# Patient Record
Sex: FEMALE | Race: WHITE | NOT HISPANIC OR LATINO | URBAN - METROPOLITAN AREA
[De-identification: names, ages, dates, MRNs, and addresses within clinical notes are randomized per-mention and may not be internally consistent; named-entity substitution may affect disease eponyms.]

---

## 2018-09-11 ENCOUNTER — ON CAMPUS - OUTPATIENT (OUTPATIENT)
Dept: URBAN - METROPOLITAN AREA HOSPITAL 85 | Facility: HOSPITAL | Age: 68
End: 2018-09-11

## 2018-09-11 ENCOUNTER — HOSPITAL ENCOUNTER (OUTPATIENT)
Dept: PREOP | Facility: HOSPITAL | Age: 68
Setting detail: HOSPITAL OUTPATIENT SURGERY
Discharge: HOME | End: 2018-09-11
Attending: INTERNAL MEDICINE | Admitting: INTERNAL MEDICINE

## 2018-09-11 DIAGNOSIS — K64.8 OTHER HEMORRHOIDS: ICD-10-CM

## 2018-09-11 DIAGNOSIS — D12.2 BENIGN NEOPLASM OF ASCENDING COLON: ICD-10-CM

## 2018-09-11 DIAGNOSIS — K57.90 DIVERTICULOSIS OF INTESTINE, PART UNSPECIFIED, WITHOUT PERFO: ICD-10-CM

## 2018-09-11 DIAGNOSIS — D12.0 BENIGN NEOPLASM OF CECUM: ICD-10-CM

## 2018-09-11 DIAGNOSIS — Z12.11 ENCOUNTER FOR SCREENING FOR MALIGNANT NEOPLASM OF COLON: ICD-10-CM

## 2018-09-11 PROCEDURE — 45385 COLONOSCOPY W/LESION REMOVAL: CPT | Performed by: INTERNAL MEDICINE

## 2019-07-08 ENCOUNTER — HOSPITAL ENCOUNTER (INPATIENT)
Facility: HOSPITAL | Age: 69
LOS: 1 days | Discharge: HOME | End: 2019-07-10
Attending: EMERGENCY MEDICINE | Admitting: INTERNAL MEDICINE

## 2019-07-08 ENCOUNTER — APPOINTMENT (OUTPATIENT)
Dept: CT IMAGING | Facility: HOSPITAL | Age: 69
End: 2019-07-08

## 2019-07-08 DIAGNOSIS — R10.12 LEFT UPPER QUADRANT PAIN: ICD-10-CM

## 2019-07-08 DIAGNOSIS — R79.89 ABNORMAL LFTS: ICD-10-CM

## 2019-07-08 DIAGNOSIS — K80.20 GALLSTONES: ICD-10-CM

## 2019-07-08 DIAGNOSIS — R10.13 EPIGASTRIC PAIN: ICD-10-CM

## 2019-07-08 DIAGNOSIS — K80.50 CHOLEDOCHOLITHIASIS: Primary | ICD-10-CM

## 2019-07-08 LAB
ALBUMIN SERPL-MCNC: 4.3 G/DL (ref 3.5–4.8)
ALBUMIN/GLOB SERPL: 1.3 G/DL (ref 1–1.7)
ALP SERPL-CCNC: 416 U/L (ref 32–91)
ALT SERPL W P-5'-P-CCNC: 87 U/L (ref 14–54)
ANION GAP SERPL CALCULATED.3IONS-SCNC: 16.5 MMOL/L (ref 5–15)
AST SERPL-CCNC: 168 U/L (ref 15–41)
BILIRUB SERPL-MCNC: 1.3 MG/DL (ref 0.3–1.2)
BILIRUB UR QL STRIP: NEGATIVE
BUN BLD-MCNC: 15 MG/DL (ref 8–20)
BUN/CREAT SERPL: 21.4 (ref 5.4–26.2)
CALCIUM SPEC-SCNC: 9.5 MG/DL (ref 8.9–10.3)
CHLORIDE SERPL-SCNC: 104 MMOL/L (ref 101–111)
CLARITY UR: ABNORMAL
CO2 SERPL-SCNC: 23 MMOL/L (ref 22–32)
COLOR UR: YELLOW
CREAT BLD-MCNC: 0.7 MG/DL (ref 0.4–1)
DEPRECATED RDW RBC AUTO: 41.6 FL (ref 37–54)
EOSINOPHIL # BLD MANUAL: 0.12 10*3/MM3 (ref 0–0.4)
EOSINOPHIL NFR BLD MANUAL: 1 % (ref 0.3–6.2)
ERYTHROCYTE [DISTWIDTH] IN BLOOD BY AUTOMATED COUNT: 13.4 % (ref 12.3–15.4)
GFR SERPL CREATININE-BSD FRML MDRD: 83 ML/MIN/1.73
GLOBULIN UR ELPH-MCNC: 3.2 GM/DL (ref 2.5–3.8)
GLUCOSE BLD-MCNC: 127 MG/DL (ref 65–99)
GLUCOSE UR STRIP-MCNC: NEGATIVE MG/DL
HCT VFR BLD AUTO: 42.6 % (ref 34–46.6)
HGB BLD-MCNC: 14.5 G/DL (ref 12–15.9)
HGB UR QL STRIP.AUTO: NEGATIVE
HOLD SPECIMEN: NORMAL
HOLD SPECIMEN: NORMAL
KETONES UR QL STRIP: NEGATIVE
LEUKOCYTE ESTERASE UR QL STRIP.AUTO: NEGATIVE
LIPASE SERPL-CCNC: 56 U/L (ref 22–51)
LYMPHOCYTES # BLD MANUAL: 1.15 10*3/MM3 (ref 0.7–3.1)
LYMPHOCYTES NFR BLD MANUAL: 10 % (ref 19.6–45.3)
MCH RBC QN AUTO: 29.9 PG (ref 26.6–33)
MCHC RBC AUTO-ENTMCNC: 33.9 G/DL (ref 31.5–35.7)
MCV RBC AUTO: 88.1 FL (ref 79–97)
NEUTROPHILS # BLD AUTO: 10.24 10*3/MM3 (ref 1.7–7)
NEUTROPHILS NFR BLD MANUAL: 69 % (ref 42.7–76)
NEUTS BAND NFR BLD MANUAL: 20 % (ref 0–5)
NITRITE UR QL STRIP: NEGATIVE
PH UR STRIP.AUTO: 8.5 [PH] (ref 5–8)
PLAT MORPH BLD: NORMAL
PLATELET # BLD AUTO: 299 10*3/MM3 (ref 140–450)
PMV BLD AUTO: 7 FL (ref 6–12)
POTASSIUM BLD-SCNC: 3.5 MMOL/L (ref 3.6–5.1)
PROT SERPL-MCNC: 7.5 G/DL (ref 6.1–7.9)
PROT UR QL STRIP: NEGATIVE
RBC # BLD AUTO: 4.84 10*6/MM3 (ref 3.77–5.28)
RBC MORPH BLD: NORMAL
SCAN SLIDE: NORMAL
SODIUM BLD-SCNC: 140 MMOL/L (ref 136–144)
SP GR UR STRIP: 1.02 (ref 1–1.03)
UROBILINOGEN UR QL STRIP: ABNORMAL
WBC MORPH BLD: NORMAL
WBC NRBC COR # BLD: 11.5 10*3/MM3 (ref 3.4–10.8)
WHOLE BLOOD HOLD SPECIMEN: NORMAL

## 2019-07-08 PROCEDURE — 25010000002 HYDROMORPHONE PER 4 MG: Performed by: EMERGENCY MEDICINE

## 2019-07-08 PROCEDURE — 83690 ASSAY OF LIPASE: CPT | Performed by: EMERGENCY MEDICINE

## 2019-07-08 PROCEDURE — 85007 BL SMEAR W/DIFF WBC COUNT: CPT | Performed by: EMERGENCY MEDICINE

## 2019-07-08 PROCEDURE — 99285 EMERGENCY DEPT VISIT HI MDM: CPT

## 2019-07-08 PROCEDURE — 85025 COMPLETE CBC W/AUTO DIFF WBC: CPT | Performed by: EMERGENCY MEDICINE

## 2019-07-08 PROCEDURE — 81003 URINALYSIS AUTO W/O SCOPE: CPT | Performed by: EMERGENCY MEDICINE

## 2019-07-08 PROCEDURE — 74177 CT ABD & PELVIS W/CONTRAST: CPT

## 2019-07-08 PROCEDURE — 0 IOPAMIDOL PER 1 ML: Performed by: EMERGENCY MEDICINE

## 2019-07-08 PROCEDURE — 80053 COMPREHEN METABOLIC PANEL: CPT | Performed by: EMERGENCY MEDICINE

## 2019-07-08 PROCEDURE — 25010000002 PROMETHAZINE PER 50 MG: Performed by: EMERGENCY MEDICINE

## 2019-07-08 RX ORDER — PANTOPRAZOLE SODIUM 40 MG/10ML
40 INJECTION, POWDER, LYOPHILIZED, FOR SOLUTION INTRAVENOUS ONCE
Status: COMPLETED | OUTPATIENT
Start: 2019-07-08 | End: 2019-07-08

## 2019-07-08 RX ORDER — HYDROMORPHONE HCL 110MG/55ML
1 PATIENT CONTROLLED ANALGESIA SYRINGE INTRAVENOUS ONCE
Status: COMPLETED | OUTPATIENT
Start: 2019-07-08 | End: 2019-07-08

## 2019-07-08 RX ORDER — SODIUM CHLORIDE, SODIUM LACTATE, POTASSIUM CHLORIDE, CALCIUM CHLORIDE 600; 310; 30; 20 MG/100ML; MG/100ML; MG/100ML; MG/100ML
100 INJECTION, SOLUTION INTRAVENOUS CONTINUOUS
Status: DISCONTINUED | OUTPATIENT
Start: 2019-07-08 | End: 2019-07-10 | Stop reason: HOSPADM

## 2019-07-08 RX ADMIN — PROMETHAZINE HYDROCHLORIDE 25 MG: 25 INJECTION INTRAMUSCULAR; INTRAVENOUS at 20:49

## 2019-07-08 RX ADMIN — PANTOPRAZOLE SODIUM 40 MG: 40 INJECTION, POWDER, FOR SOLUTION INTRAVENOUS at 20:49

## 2019-07-08 RX ADMIN — HYDROMORPHONE HYDROCHLORIDE 1 MG: 2 INJECTION, SOLUTION INTRAMUSCULAR; INTRAVENOUS; SUBCUTANEOUS at 20:51

## 2019-07-08 RX ADMIN — IOPAMIDOL 100 ML: 755 INJECTION, SOLUTION INTRAVENOUS at 22:30

## 2019-07-08 RX ADMIN — SODIUM CHLORIDE, SODIUM LACTATE, POTASSIUM CHLORIDE, AND CALCIUM CHLORIDE 1000 ML: .6; .31; .03; .02 INJECTION, SOLUTION INTRAVENOUS at 20:51

## 2019-07-09 ENCOUNTER — INPATIENT HOSPITAL (OUTPATIENT)
Dept: URBAN - METROPOLITAN AREA HOSPITAL 84 | Facility: HOSPITAL | Age: 69
End: 2019-07-09

## 2019-07-09 ENCOUNTER — APPOINTMENT (OUTPATIENT)
Dept: GENERAL RADIOLOGY | Facility: HOSPITAL | Age: 69
End: 2019-07-09

## 2019-07-09 ENCOUNTER — ANESTHESIA (OUTPATIENT)
Dept: GASTROENTEROLOGY | Facility: HOSPITAL | Age: 69
End: 2019-07-09

## 2019-07-09 ENCOUNTER — ANESTHESIA EVENT (OUTPATIENT)
Dept: GASTROENTEROLOGY | Facility: HOSPITAL | Age: 69
End: 2019-07-09

## 2019-07-09 DIAGNOSIS — K80.50 CALCULUS OF BILE DUCT WITHOUT CHOLANGITIS OR CHOLECYSTITIS W: ICD-10-CM

## 2019-07-09 DIAGNOSIS — R10.13 EPIGASTRIC PAIN: ICD-10-CM

## 2019-07-09 DIAGNOSIS — R79.89 OTHER SPECIFIED ABNORMAL FINDINGS OF BLOOD CHEMISTRY: ICD-10-CM

## 2019-07-09 DIAGNOSIS — K83.8 OTHER SPECIFIED DISEASES OF BILIARY TRACT: ICD-10-CM

## 2019-07-09 PROBLEM — G47.00 INSOMNIA: Chronic | Status: ACTIVE | Noted: 2019-07-09

## 2019-07-09 PROBLEM — F32.A DEPRESSION: Chronic | Status: ACTIVE | Noted: 2019-07-09

## 2019-07-09 PROBLEM — R10.12 LEFT UPPER QUADRANT PAIN: Status: ACTIVE | Noted: 2019-07-09

## 2019-07-09 LAB
ALBUMIN SERPL-MCNC: 3.5 G/DL (ref 3.5–4.8)
ALBUMIN/GLOB SERPL: 1.3 G/DL (ref 1–1.7)
ALP SERPL-CCNC: 346 U/L (ref 32–91)
ALT SERPL W P-5'-P-CCNC: 218 U/L (ref 14–54)
ANION GAP SERPL CALCULATED.3IONS-SCNC: 14.1 MMOL/L (ref 5–15)
AST SERPL-CCNC: 361 U/L (ref 15–41)
BASOPHILS # BLD AUTO: 0 10*3/MM3 (ref 0–0.2)
BASOPHILS NFR BLD AUTO: 0.2 % (ref 0–1.5)
BILIRUB SERPL-MCNC: 2.2 MG/DL (ref 0.3–1.2)
BUN BLD-MCNC: 13 MG/DL (ref 8–20)
BUN/CREAT SERPL: 18.6 (ref 5.4–26.2)
CALCIUM SPEC-SCNC: 8.4 MG/DL (ref 8.9–10.3)
CHLORIDE SERPL-SCNC: 105 MMOL/L (ref 101–111)
CO2 SERPL-SCNC: 21 MMOL/L (ref 22–32)
CREAT BLD-MCNC: 0.7 MG/DL (ref 0.4–1)
DEPRECATED RDW RBC AUTO: 42 FL (ref 37–54)
EOSINOPHIL # BLD AUTO: 0 10*3/MM3 (ref 0–0.4)
EOSINOPHIL NFR BLD AUTO: 0 % (ref 0.3–6.2)
ERYTHROCYTE [DISTWIDTH] IN BLOOD BY AUTOMATED COUNT: 13.4 % (ref 12.3–15.4)
GFR SERPL CREATININE-BSD FRML MDRD: 83 ML/MIN/1.73
GLOBULIN UR ELPH-MCNC: 2.7 GM/DL (ref 2.5–3.8)
GLUCOSE BLD-MCNC: 116 MG/DL (ref 65–99)
HCT VFR BLD AUTO: 38.3 % (ref 34–46.6)
HGB BLD-MCNC: 12.5 G/DL (ref 12–15.9)
LYMPHOCYTES # BLD AUTO: 0.7 10*3/MM3 (ref 0.7–3.1)
LYMPHOCYTES NFR BLD AUTO: 5.2 % (ref 19.6–45.3)
MCH RBC QN AUTO: 29.5 PG (ref 26.6–33)
MCHC RBC AUTO-ENTMCNC: 32.7 G/DL (ref 31.5–35.7)
MCV RBC AUTO: 90.2 FL (ref 79–97)
MONOCYTES # BLD AUTO: 0.8 10*3/MM3 (ref 0.1–0.9)
MONOCYTES NFR BLD AUTO: 6.1 % (ref 5–12)
NEUTROPHILS # BLD AUTO: 11.5 10*3/MM3 (ref 1.7–7)
NEUTROPHILS NFR BLD AUTO: 88.5 % (ref 42.7–76)
NRBC BLD AUTO-RTO: 0 /100 WBC (ref 0–0.2)
PLATELET # BLD AUTO: 240 10*3/MM3 (ref 140–450)
PMV BLD AUTO: 7.1 FL (ref 6–12)
POTASSIUM BLD-SCNC: 4.1 MMOL/L (ref 3.6–5.1)
PROT SERPL-MCNC: 6.2 G/DL (ref 6.1–7.9)
RBC # BLD AUTO: 4.24 10*6/MM3 (ref 3.77–5.28)
SODIUM BLD-SCNC: 136 MMOL/L (ref 136–144)
WBC NRBC COR # BLD: 12.9 10*3/MM3 (ref 3.4–10.8)

## 2019-07-09 PROCEDURE — 43262 ENDO CHOLANGIOPANCREATOGRAPH: CPT | Performed by: INTERNAL MEDICINE

## 2019-07-09 PROCEDURE — 43264 ERCP REMOVE DUCT CALCULI: CPT | Performed by: INTERNAL MEDICINE

## 2019-07-09 PROCEDURE — 74328 X-RAY BILE DUCT ENDOSCOPY: CPT

## 2019-07-09 PROCEDURE — 99220 PR INITIAL OBSERVATION CARE/DAY 70 MINUTES: CPT | Performed by: NURSE PRACTITIONER

## 2019-07-09 PROCEDURE — C1769 GUIDE WIRE: HCPCS | Performed by: INTERNAL MEDICINE

## 2019-07-09 PROCEDURE — G0378 HOSPITAL OBSERVATION PER HR: HCPCS

## 2019-07-09 PROCEDURE — 0FC78ZZ EXTIRPATION OF MATTER FROM COMMON HEPATIC DUCT, VIA NATURAL OR ARTIFICIAL OPENING ENDOSCOPIC: ICD-10-PCS | Performed by: INTERNAL MEDICINE

## 2019-07-09 PROCEDURE — 25010000002 PROPOFOL 200 MG/20ML EMULSION: Performed by: ANESTHESIOLOGY

## 2019-07-09 PROCEDURE — 85025 COMPLETE CBC W/AUTO DIFF WBC: CPT | Performed by: NURSE PRACTITIONER

## 2019-07-09 PROCEDURE — 25010000002 FENTANYL CITRATE (PF) 250 MCG/5ML SOLUTION: Performed by: ANESTHESIOLOGY

## 2019-07-09 PROCEDURE — 0 IOPAMIDOL 41 % SOLUTION 20 ML VIAL: Performed by: INTERNAL MEDICINE

## 2019-07-09 PROCEDURE — 25010000002 KETOROLAC TROMETHAMINE PER 15 MG: Performed by: NURSE PRACTITIONER

## 2019-07-09 PROCEDURE — 25010000002 DIPHENHYDRAMINE PER 50 MG: Performed by: NURSE PRACTITIONER

## 2019-07-09 PROCEDURE — 25010000002 LEVOFLOXACIN PER 250 MG: Performed by: NURSE PRACTITIONER

## 2019-07-09 PROCEDURE — 80053 COMPREHEN METABOLIC PANEL: CPT | Performed by: NURSE PRACTITIONER

## 2019-07-09 RX ORDER — PROPOFOL 10 MG/ML
INJECTION, EMULSION INTRAVENOUS AS NEEDED
Status: DISCONTINUED | OUTPATIENT
Start: 2019-07-09 | End: 2019-07-09 | Stop reason: SURG

## 2019-07-09 RX ORDER — FENTANYL CITRATE 50 UG/ML
INJECTION, SOLUTION INTRAMUSCULAR; INTRAVENOUS AS NEEDED
Status: DISCONTINUED | OUTPATIENT
Start: 2019-07-09 | End: 2019-07-09 | Stop reason: SURG

## 2019-07-09 RX ORDER — LEVOFLOXACIN 5 MG/ML
500 INJECTION, SOLUTION INTRAVENOUS ONCE
Status: COMPLETED | OUTPATIENT
Start: 2019-07-09 | End: 2019-07-09

## 2019-07-09 RX ORDER — HYDRALAZINE HYDROCHLORIDE 20 MG/ML
5 INJECTION INTRAMUSCULAR; INTRAVENOUS
Status: DISCONTINUED | OUTPATIENT
Start: 2019-07-09 | End: 2019-07-09 | Stop reason: HOSPADM

## 2019-07-09 RX ORDER — ZOLPIDEM TARTRATE 5 MG/1
5 TABLET ORAL NIGHTLY PRN
Status: DISCONTINUED | OUTPATIENT
Start: 2019-07-09 | End: 2019-07-10 | Stop reason: HOSPADM

## 2019-07-09 RX ORDER — TRAZODONE HYDROCHLORIDE 50 MG/1
50 TABLET ORAL NIGHTLY
Status: DISCONTINUED | OUTPATIENT
Start: 2019-07-09 | End: 2019-07-10 | Stop reason: HOSPADM

## 2019-07-09 RX ORDER — SODIUM CHLORIDE 9 MG/ML
50 INJECTION, SOLUTION INTRAVENOUS CONTINUOUS
Status: DISCONTINUED | OUTPATIENT
Start: 2019-07-09 | End: 2019-07-10 | Stop reason: HOSPADM

## 2019-07-09 RX ORDER — OMEPRAZOLE 20 MG/1
20 CAPSULE, DELAYED RELEASE ORAL 2 TIMES DAILY
COMMUNITY

## 2019-07-09 RX ORDER — SODIUM CHLORIDE 0.9 % (FLUSH) 0.9 %
3 SYRINGE (ML) INJECTION EVERY 12 HOURS SCHEDULED
Status: DISCONTINUED | OUTPATIENT
Start: 2019-07-09 | End: 2019-07-09

## 2019-07-09 RX ORDER — PANTOPRAZOLE SODIUM 40 MG/1
40 TABLET, DELAYED RELEASE ORAL EVERY MORNING
Status: DISCONTINUED | OUTPATIENT
Start: 2019-07-09 | End: 2019-07-10 | Stop reason: HOSPADM

## 2019-07-09 RX ORDER — ONDANSETRON 2 MG/ML
4 INJECTION INTRAMUSCULAR; INTRAVENOUS ONCE AS NEEDED
Status: DISCONTINUED | OUTPATIENT
Start: 2019-07-09 | End: 2019-07-09

## 2019-07-09 RX ORDER — ONDANSETRON 4 MG/1
4 TABLET, FILM COATED ORAL EVERY 8 HOURS PRN
COMMUNITY
End: 2023-03-04

## 2019-07-09 RX ORDER — LIDOCAINE HYDROCHLORIDE 20 MG/ML
INJECTION, SOLUTION EPIDURAL; INFILTRATION; INTRACAUDAL; PERINEURAL AS NEEDED
Status: DISCONTINUED | OUTPATIENT
Start: 2019-07-09 | End: 2019-07-09 | Stop reason: SURG

## 2019-07-09 RX ORDER — PROMETHAZINE HYDROCHLORIDE 25 MG/1
25 TABLET ORAL ONCE AS NEEDED
Status: DISCONTINUED | OUTPATIENT
Start: 2019-07-09 | End: 2019-07-09 | Stop reason: HOSPADM

## 2019-07-09 RX ORDER — NALOXONE HCL 0.4 MG/ML
0.4 VIAL (ML) INJECTION AS NEEDED
Status: DISCONTINUED | OUTPATIENT
Start: 2019-07-09 | End: 2019-07-09 | Stop reason: HOSPADM

## 2019-07-09 RX ORDER — LEVOFLOXACIN 5 MG/ML
500 INJECTION, SOLUTION INTRAVENOUS EVERY 24 HOURS
Status: DISCONTINUED | OUTPATIENT
Start: 2019-07-10 | End: 2019-07-10

## 2019-07-09 RX ORDER — SUCRALFATE 1 G/1
1 TABLET ORAL 4 TIMES DAILY
COMMUNITY

## 2019-07-09 RX ORDER — FLUMAZENIL 0.1 MG/ML
0.2 INJECTION INTRAVENOUS AS NEEDED
Status: DISCONTINUED | OUTPATIENT
Start: 2019-07-09 | End: 2019-07-09 | Stop reason: HOSPADM

## 2019-07-09 RX ORDER — LABETALOL HYDROCHLORIDE 5 MG/ML
5 INJECTION, SOLUTION INTRAVENOUS
Status: DISCONTINUED | OUTPATIENT
Start: 2019-07-09 | End: 2019-07-09 | Stop reason: HOSPADM

## 2019-07-09 RX ORDER — PHENYLEPHRINE HCL IN 0.9% NACL 0.5 MG/5ML
.5-3 SYRINGE (ML) INTRAVENOUS
Status: DISCONTINUED | OUTPATIENT
Start: 2019-07-09 | End: 2019-07-09 | Stop reason: HOSPADM

## 2019-07-09 RX ORDER — SENNA AND DOCUSATE SODIUM 50; 8.6 MG/1; MG/1
1 TABLET, FILM COATED ORAL DAILY PRN
COMMUNITY

## 2019-07-09 RX ORDER — SERTRALINE HYDROCHLORIDE 100 MG/1
100 TABLET, FILM COATED ORAL DAILY
COMMUNITY

## 2019-07-09 RX ORDER — SODIUM CHLORIDE 0.9 % (FLUSH) 0.9 %
3-10 SYRINGE (ML) INJECTION AS NEEDED
Status: DISCONTINUED | OUTPATIENT
Start: 2019-07-09 | End: 2019-07-10 | Stop reason: HOSPADM

## 2019-07-09 RX ORDER — DIPHENHYDRAMINE HYDROCHLORIDE 50 MG/ML
12.5 INJECTION INTRAMUSCULAR; INTRAVENOUS
Status: DISCONTINUED | OUTPATIENT
Start: 2019-07-09 | End: 2019-07-09 | Stop reason: HOSPADM

## 2019-07-09 RX ORDER — PROMETHAZINE HYDROCHLORIDE 25 MG/ML
6.25 INJECTION, SOLUTION INTRAMUSCULAR; INTRAVENOUS ONCE AS NEEDED
Status: DISCONTINUED | OUTPATIENT
Start: 2019-07-09 | End: 2019-07-09 | Stop reason: HOSPADM

## 2019-07-09 RX ORDER — MORPHINE SULFATE 4 MG/ML
1 INJECTION, SOLUTION INTRAMUSCULAR; INTRAVENOUS
Status: DISCONTINUED | OUTPATIENT
Start: 2019-07-09 | End: 2019-07-09 | Stop reason: HOSPADM

## 2019-07-09 RX ORDER — SODIUM CHLORIDE 0.9 % (FLUSH) 0.9 %
3-10 SYRINGE (ML) INJECTION AS NEEDED
Status: DISCONTINUED | OUTPATIENT
Start: 2019-07-09 | End: 2019-07-09

## 2019-07-09 RX ORDER — FENTANYL CITRATE 50 UG/ML
50 INJECTION, SOLUTION INTRAMUSCULAR; INTRAVENOUS
Status: DISCONTINUED | OUTPATIENT
Start: 2019-07-09 | End: 2019-07-09 | Stop reason: HOSPADM

## 2019-07-09 RX ORDER — CYCLOSPORINE 0.5 MG/ML
1 EMULSION OPHTHALMIC 2 TIMES DAILY
Status: DISCONTINUED | OUTPATIENT
Start: 2019-07-09 | End: 2019-07-10 | Stop reason: HOSPADM

## 2019-07-09 RX ORDER — DIPHENHYDRAMINE HYDROCHLORIDE 50 MG/ML
12.5 INJECTION INTRAMUSCULAR; INTRAVENOUS ONCE
Status: COMPLETED | OUTPATIENT
Start: 2019-07-09 | End: 2019-07-09

## 2019-07-09 RX ORDER — ONDANSETRON 2 MG/ML
4 INJECTION INTRAMUSCULAR; INTRAVENOUS ONCE AS NEEDED
Status: DISCONTINUED | OUTPATIENT
Start: 2019-07-09 | End: 2019-07-09 | Stop reason: HOSPADM

## 2019-07-09 RX ORDER — MEPERIDINE HYDROCHLORIDE 25 MG/ML
12.5 INJECTION INTRAMUSCULAR; INTRAVENOUS; SUBCUTANEOUS
Status: DISCONTINUED | OUTPATIENT
Start: 2019-07-09 | End: 2019-07-09 | Stop reason: HOSPADM

## 2019-07-09 RX ORDER — KETOROLAC TROMETHAMINE 15 MG/ML
15 INJECTION, SOLUTION INTRAMUSCULAR; INTRAVENOUS EVERY 6 HOURS PRN
Status: DISCONTINUED | OUTPATIENT
Start: 2019-07-09 | End: 2019-07-10 | Stop reason: HOSPADM

## 2019-07-09 RX ORDER — SENNA PLUS 8.6 MG/1
1 TABLET ORAL NIGHTLY PRN
Status: DISCONTINUED | OUTPATIENT
Start: 2019-07-09 | End: 2019-07-10 | Stop reason: HOSPADM

## 2019-07-09 RX ORDER — ONDANSETRON 4 MG/1
4 TABLET, FILM COATED ORAL EVERY 8 HOURS PRN
Status: DISCONTINUED | OUTPATIENT
Start: 2019-07-09 | End: 2019-07-10 | Stop reason: HOSPADM

## 2019-07-09 RX ORDER — ACETAMINOPHEN 325 MG/1
650 TABLET ORAL ONCE AS NEEDED
Status: DISCONTINUED | OUTPATIENT
Start: 2019-07-09 | End: 2019-07-09 | Stop reason: HOSPADM

## 2019-07-09 RX ORDER — PROMETHAZINE HYDROCHLORIDE 25 MG/1
25 SUPPOSITORY RECTAL ONCE AS NEEDED
Status: DISCONTINUED | OUTPATIENT
Start: 2019-07-09 | End: 2019-07-09 | Stop reason: HOSPADM

## 2019-07-09 RX ORDER — TRAZODONE HYDROCHLORIDE 50 MG/1
50 TABLET ORAL NIGHTLY
COMMUNITY

## 2019-07-09 RX ORDER — ROCURONIUM BROMIDE 10 MG/ML
INJECTION, SOLUTION INTRAVENOUS AS NEEDED
Status: DISCONTINUED | OUTPATIENT
Start: 2019-07-09 | End: 2019-07-09 | Stop reason: SURG

## 2019-07-09 RX ORDER — CYCLOSPORINE 0.5 MG/ML
1 EMULSION OPHTHALMIC 2 TIMES DAILY
COMMUNITY

## 2019-07-09 RX ORDER — SERTRALINE HYDROCHLORIDE 100 MG/1
100 TABLET, FILM COATED ORAL DAILY
Status: DISCONTINUED | OUTPATIENT
Start: 2019-07-09 | End: 2019-07-10 | Stop reason: HOSPADM

## 2019-07-09 RX ORDER — ACETAMINOPHEN 650 MG/1
650 SUPPOSITORY RECTAL ONCE AS NEEDED
Status: DISCONTINUED | OUTPATIENT
Start: 2019-07-09 | End: 2019-07-09 | Stop reason: HOSPADM

## 2019-07-09 RX ORDER — SODIUM CHLORIDE 0.9 % (FLUSH) 0.9 %
3 SYRINGE (ML) INJECTION EVERY 12 HOURS SCHEDULED
Status: DISCONTINUED | OUTPATIENT
Start: 2019-07-09 | End: 2019-07-10 | Stop reason: HOSPADM

## 2019-07-09 RX ADMIN — ROCURONIUM BROMIDE 35 MG: 10 INJECTION, SOLUTION INTRAVENOUS at 11:07

## 2019-07-09 RX ADMIN — SODIUM CHLORIDE, SODIUM LACTATE, POTASSIUM CHLORIDE, AND CALCIUM CHLORIDE 100 ML/HR: 600; 310; 30; 20 INJECTION, SOLUTION INTRAVENOUS at 02:56

## 2019-07-09 RX ADMIN — ZOLPIDEM TARTRATE 5 MG: 5 TABLET, FILM COATED ORAL at 22:54

## 2019-07-09 RX ADMIN — KETOROLAC TROMETHAMINE 15 MG: 15 INJECTION, SOLUTION INTRAMUSCULAR; INTRAVENOUS at 20:09

## 2019-07-09 RX ADMIN — LEVOFLOXACIN 500 MG: 5 INJECTION, SOLUTION INTRAVENOUS at 12:47

## 2019-07-09 RX ADMIN — SODIUM CHLORIDE, PRESERVATIVE FREE 3 ML: 5 INJECTION INTRAVENOUS at 20:09

## 2019-07-09 RX ADMIN — TRAZODONE HYDROCHLORIDE 50 MG: 50 TABLET ORAL at 20:09

## 2019-07-09 RX ADMIN — SODIUM CHLORIDE, SODIUM LACTATE, POTASSIUM CHLORIDE, AND CALCIUM CHLORIDE 200 ML/HR: 600; 310; 30; 20 INJECTION, SOLUTION INTRAVENOUS at 00:45

## 2019-07-09 RX ADMIN — SODIUM CHLORIDE, SODIUM LACTATE, POTASSIUM CHLORIDE, AND CALCIUM CHLORIDE 100 ML/HR: 600; 310; 30; 20 INJECTION, SOLUTION INTRAVENOUS at 14:37

## 2019-07-09 RX ADMIN — KETOROLAC TROMETHAMINE 15 MG: 15 INJECTION, SOLUTION INTRAMUSCULAR; INTRAVENOUS at 01:43

## 2019-07-09 RX ADMIN — LIDOCAINE HYDROCHLORIDE 30 MG: 20 INJECTION, SOLUTION EPIDURAL; INFILTRATION; INTRACAUDAL; PERINEURAL at 11:07

## 2019-07-09 RX ADMIN — DIPHENHYDRAMINE HYDROCHLORIDE 12.5 MG: 50 INJECTION, SOLUTION INTRAMUSCULAR; INTRAVENOUS at 03:42

## 2019-07-09 RX ADMIN — FENTANYL CITRATE 100 MCG: 50 INJECTION INTRAMUSCULAR; INTRAVENOUS at 11:07

## 2019-07-09 RX ADMIN — CYCLOSPORINE 1 DROP: 0.5 EMULSION OPHTHALMIC at 20:09

## 2019-07-09 RX ADMIN — PROPOFOL 120 MG: 10 INJECTION, EMULSION INTRAVENOUS at 11:05

## 2019-07-09 RX ADMIN — SODIUM CHLORIDE, PRESERVATIVE FREE 3 ML: 5 INJECTION INTRAVENOUS at 03:44

## 2019-07-09 RX ADMIN — SODIUM CHLORIDE 50 ML/HR: 0.9 INJECTION, SOLUTION INTRAVENOUS at 11:02

## 2019-07-09 RX ADMIN — SODIUM CHLORIDE, PRESERVATIVE FREE 3 ML: 5 INJECTION INTRAVENOUS at 08:27

## 2019-07-09 NOTE — ANESTHESIA PREPROCEDURE EVALUATION
Anesthesia Evaluation     Patient summary reviewed and Nursing notes reviewed   no history of anesthetic complications:  NPO Solid Status: > 8 hours  NPO Liquid Status: > 8 hours           Airway   Mallampati: II  TM distance: >3 FB  Neck ROM: full  No difficulty expected  Dental      Pulmonary - negative pulmonary ROS    breath sounds clear to auscultation  Cardiovascular     ECG reviewed  Rhythm: regular  Rate: normal    (+) hypertension well controlled less than 2 medications, hyperlipidemia,       Neuro/Psych  (+) psychiatric history Depression,     GI/Hepatic/Renal/Endo    (+)  GERD,      Musculoskeletal     Abdominal     Abdomen: soft.   Substance History - negative use     OB/GYN negative ob/gyn ROS         Other   (+) arthritis                     Anesthesia Plan    ASA 2     general     intravenous induction   Anesthetic plan, all risks, benefits, and alternatives have been provided, discussed and informed consent has been obtained with: patient.    Plan discussed with CAA.

## 2019-07-09 NOTE — ED NOTES
Returns from CT. Denies pain, nausea at this time. IVF continue to infuse. No needs at this time.     Nora Manuel RN  07/08/19 3710

## 2019-07-09 NOTE — ANESTHESIA POSTPROCEDURE EVALUATION
Patient: Wandy Roberts    Procedure Summary     Date:  07/09/19 Room / Location:  [facility] ENDOSCOPY 2 / [facility] ENDOSCOPY    Anesthesia Start:  1100 Anesthesia Stop:  1204    Procedure:  ENDOSCOPIC RETROGRADE CHOLANGIOPANCREATOGRAPHY, SPHINCTEROTOMY, CLEARANCE OF BILE DUCT (9MM-12MM), UP TO 12MM, EXTRACTION OF BILIARY STONES WITH BALLOON (N/A ) Diagnosis:       Abnormal LFTs      Epigastric pain      (Abnormal LFTs [R94.5])      (Epigastric pain [R10.13])    Surgeon:  Kamron Segura MD Provider:  Sarahy Child MD    Anesthesia Type:  general ASA Status:  2          Anesthesia Type: general  Last vitals  BP   146/56 (07/09/19 1239)   Temp   97.3 °F (36.3 °C) (07/09/19 1204)   Pulse   70 (07/09/19 1239)   Resp   17 (07/09/19 1239)     SpO2   100 % (07/09/19 1239)     Post Anesthesia Care and Evaluation    Patient location during evaluation: PACU  Patient participation: complete - patient participated  Level of consciousness: awake  Pain management: adequate  Airway patency: patent  Anesthetic complications: No anesthetic complications    Cardiovascular status: acceptable  Respiratory status: acceptable  Hydration status: acceptable

## 2019-07-09 NOTE — ED PROVIDER NOTES
Subjective   69-year-old complaining of severe epigastric and left upper quadrant pain starting today.  She states is it was associated with nausea and she is vomited 2-3 times.  She states she had similar pain in the past with pancreatitis.  She is distantly status post cholecystectomy.  The patient reports she had no definite fever or chills today.  She reports that she is still nauseated.  She denies nausea.  She reports no melena hematemesis or hematochezia.  She denies dysuria or hematuria.she reports no recent alcohol intake and states that she was told in the past her pancreatitis was from gallstone            Review of Systems   Constitutional: Positive for appetite change and diaphoresis. Negative for chills, fever and unexpected weight change.   HENT: Negative for sore throat.    Eyes: Negative for photophobia and visual disturbance.   Respiratory: Negative for apnea, chest tightness and wheezing.    Cardiovascular: Negative for palpitations and leg swelling.   Gastrointestinal: Positive for abdominal distention, abdominal pain, nausea and vomiting. Negative for anal bleeding, blood in stool, constipation and diarrhea.   Genitourinary: Negative for dysuria and pelvic pain.   Musculoskeletal: Negative for myalgias and neck stiffness.   Skin: Positive for pallor.   Allergic/Immunologic: Negative for food allergies.   Neurological: Negative for tremors and numbness.   All other systems reviewed and are negative.      History reviewed. No pertinent past medical history.    Allergies   Allergen Reactions   • Codeine Itching   • Penicillins Unknown (See Comments)     Childhood        History reviewed. No pertinent surgical history.    No family history on file.    Social History     Socioeconomic History   • Marital status:      Spouse name: Not on file   • Number of children: Not on file   • Years of education: Not on file   • Highest education level: Not on file           Objective   Physical  Exam  Alert Elian Coma Scale 15   HEENT: Pupils equal and reactive to light. Conjunctivae are not injected. normal tympanic membranes. Oropharynx and nares are normal.   Neck: Supple. Midline trachea. No JVD. No goiter.   Chest: Clear and equal breath sounds bilaterally regular rate and rhythm without murmur or rub.   Abdomen: Positive bowel sounds the gastric and left upper quadrant tenderness is noted.  Negative Simmons sign.  The abdomen is nondistended. No rebound or peritoneal signs. No CVA tenderness.  No other referred pain  Extremities no clubbing cyanosis or edema motor sensory exam is normal the full range of motion is intact   skin: Warm and dry, no rashes or petechia.   Lymphatic: No regional lymphadenopathy. No calf pain, swelling or Sonia's sign    Procedures           ED Course  ED Course as of Jul 09 0017   Tue Jul 09, 2019   0009 And nausea relieved in the emergency department patient was advised of the CT results.  The patient will need admission  [TH]      ED Course User Index  [TH] Matt Mckeon MD                  MDM  Number of Diagnoses or Management Options     Amount and/or Complexity of Data Reviewed  Clinical lab tests: reviewed  Tests in the radiology section of CPT®: reviewed  Decide to obtain previous medical records or to obtain history from someone other than the patient: yes  Obtain history from someone other than the patient: yes  Review and summarize past medical records: yes  Discuss the patient with other providers: yes  Independent visualization of images, tracings, or specimens: yes    Risk of Complications, Morbidity, and/or Mortality  Presenting problems: high  Diagnostic procedures: high  Management options: high  General comments: The patient will likely need MRCP.  The patient will be admitted to the hospital.  She will need hydration.  Pain and nausea were controlled in the ER.  The case was discussed the hospitalist practitioner the patient was agreeable with this  plan of treatment    Patient Progress  Patient progress: improved        Final diagnoses:   Choledocholithiasis   Left upper quadrant pain            Matt Mckeon MD  07/09/19 0017

## 2019-07-09 NOTE — ADDENDUM NOTE
Addendum  created 07/09/19 1251 by Sarahy Child MD    Review and Sign - Ready for Procedure, Review and Sign - Signed, Sign clinical note

## 2019-07-09 NOTE — ED NOTES
Report given to Sindhu CHAMBERS RN- care transferred. Pt. Denies pain, s/o at bedside. Ambulatory to restroom with no complication. Awaiting CT results.     Nora Manuel RN  07/09/19 0003

## 2019-07-09 NOTE — CONSULTS
"GI CONSULT  NOTE:    Referring Provider:  Dr. Sweet    Chief complaint: Choledocholithiasis    Subjective . \"abdominal pain like my previous pancreatitis\"    History of present illness:  Patient is a 68 y/o female with a history of cholelithiasis with previous cholecystectomy, pancreatitis secondary to choledocholithiasis status post ERCP in 2016 mild gastroparesis.  She presents with intermittent epigastric and left upper quadrant pain for the last few weeks that worsened yesterday with associated nausea and vomiting.  She reports pain is similar to previous pancreatitis episode.  She has had nausea and vomiting without hematemesis as well as a 4 to 6-month history of early satiety and decreased appetite.  She has had an unintentional weight loss of 10 pounds but denies any fevers or chills.  No constipation on MiraLAX and takes Gas-X as needed.  No melena or rectal bleeding.  She was found to have abnormal LFTs on admission with increased biliary dilation on CT. CT does not suggest pancreatitis.  She denies any obvious trigger to pain nor exacerbating or relieving factors.  No increased shortness of breath or chest pain.      Endo History:  9/2018 colonoscopy by Dr. Lechuga -polyps (TA/SSA), diverticulosis, hemorrhoids  10/2016 EGD by Dr. Marmolejo -normal status post removal of CBD stents x2  8/2016 ERCP by Dr. Marmolejo -marked biliary dilation with CBD stone status post extraction and stent placement.    Past Medical History:  Past Medical History:   Diagnosis Date   • Arthritis    • Cancer (CMS/HCC)     skin carnicomas   • Cholelithiasis    • Depression    • Elevated cholesterol    • GERD (gastroesophageal reflux disease)    • Hypertension    • Pancreatitis due to biliary obstruction    • PUD (peptic ulcer disease)        Past Surgical History:  Past Surgical History:   Procedure Laterality Date   • ABDOMINAL SURGERY     • APPENDECTOMY     • BILE DUCT STENT PLACEMENT     • CHOLECYSTECTOMY     • COLONOSCOPY     • " FRACTURE SURGERY     • HYSTERECTOMY     • SKIN BIOPSY     • TONSILLECTOMY     EGD, colonoscopy, ERCP    Social History:  Social History     Tobacco Use   • Smoking status: Never Smoker   • Smokeless tobacco: Never Used   Substance Use Topics   • Alcohol use: Yes     Alcohol/week: 0.6 oz     Types: 1 Cans of beer per week   • Drug use: No   No tobacco or illicit drug use.  Occasional alcohol use    Family History:  Family History   Problem Relation Age of Onset   • Diabetes Mother    • Cancer Father    • Cancer Brother    No family history of pancreatic disease    Medications:  Medications Prior to Admission   Medication Sig Dispense Refill Last Dose   • cycloSPORINE (RESTASIS) 0.05 % ophthalmic emulsion Administer 1 drop to both eyes 2 (Two) Times a Day.      • omeprazole (priLOSEC) 20 MG capsule Take 20 mg by mouth 2 (Two) Times a Day.      • ondansetron (ZOFRAN) 4 MG tablet Take 4 mg by mouth Every 8 (Eight) Hours As Needed for Nausea or Vomiting.      • sennosides-docusate sodium (SENOKOT-S) 8.6-50 MG tablet Take 1 tablet by mouth Daily As Needed for Constipation.      • sertraline (ZOLOFT) 100 MG tablet Take 100 mg by mouth Daily.      • traZODone (DESYREL) 50 MG tablet Take 50 mg by mouth Every Night.          Scheduled Meds:  cycloSPORINE 1 drop Both Eyes BID   pantoprazole 40 mg Oral QAM   sertraline 100 mg Oral Daily   sodium chloride 3 mL Intravenous Q12H   traZODone 50 mg Oral Nightly     Continuous Infusions:  lactated ringers 100 mL/hr Last Rate: 100 mL/hr (07/09/19 0726)     PRN Meds:.ketorolac  •  ondansetron  •  promethazine  •  senna  •  sodium chloride    ALLERGIES:  Codeine and Penicillins    ROS:  Review of Systems   Constitutional: Negative for chills and fever.   HENT: Negative for nosebleeds and trouble swallowing.    Respiratory: Negative for chest tightness and shortness of breath.    Cardiovascular: Negative for chest pain and palpitations.   Gastrointestinal: Positive for abdominal pain,  nausea and vomiting. Negative for blood in stool, constipation and diarrhea.   Genitourinary: Negative for difficulty urinating and dysuria.   Musculoskeletal: Negative for arthralgias and gait problem.   Skin: Negative for color change and rash.   Neurological: Negative for dizziness and light-headedness.   Psychiatric/Behavioral: Negative for agitation and confusion.       Objective -Resting in bed, no acute distress, family in room    Vital Signs:   Temp:  [97.4 °F (36.3 °C)-98 °F (36.7 °C)] 97.4 °F (36.3 °C)  Heart Rate:  [] 75  Resp:  [15-22] 16  BP: (121-176)/(48-78) 126/72    Physical Exam:      General Appearance:    Awake and alert, in no acute distress   Head:    Normocephalic, without obvious abnormality, atraumatic   Eyes:            Conjunctivae normal, anicteric sclera   Ears:    Ears appear intact with no abnormalities noted   Throat:   No oral lesions, no thrush, oral mucosa moist   Neck:   , supple, no JVD   Lungs:     , respirations regular, even and unlabored    Heart:    Regular rhythm and normal rate, normal S1 and S2   Chest Wall:    No abnormalities observed   Abdomen:      soft, epigastric/LUQ tender, no rebound or guarding, non-distended, no hepatosplenomegaly    Rectal:     Deferred   Extremities:   Moves all extremities well, no edema, no cyanosis, no             redness       Skin:   No bleeding, bruising or rash, no jaundice       Neurologic:   Cranial nerves 2 - 12 grossly intact,  sensation intact       Results Review:   I reviewed the patient's labs and imaging.  Lab Results (last 24 hours)     Procedure Component Value Units Date/Time    Comprehensive Metabolic Panel [[id number]]  (Abnormal) Collected:  07/09/19 0224    Specimen:  Blood Updated:  07/09/19 0346     Glucose 116 mg/dL      BUN 13 mg/dL      Creatinine 0.70 mg/dL      Sodium 136 mmol/L      Potassium 4.1 mmol/L      Chloride 105 mmol/L      CO2 21.0 mmol/L      Calcium 8.4 mg/dL      Total Protein 6.2 g/dL       Albumin 3.50 g/dL      ALT (SGPT) 218 U/L      AST (SGOT) 361 U/L      Alkaline Phosphatase 346 U/L      Total Bilirubin 2.2 mg/dL      eGFR Non African Amer 83 mL/min/1.73      Globulin 2.7 gm/dL      A/G Ratio 1.3 g/dL      BUN/Creatinine Ratio 18.6     Anion Gap 14.1 mmol/L     CBC & Differential [[id number]] Collected:  07/09/19 0220    Specimen:  Blood Updated:  07/09/19 0342    Narrative:       The following orders were created for panel order CBC & Differential.  Procedure                               Abnormality         Status                     ---------                               -----------         ------                     CBC Auto Differential[[id number]]        Abnormal            Final result                 Please view results for these tests on the individual orders.    CBC Auto Differential [[id number]]  (Abnormal) Collected:  07/09/19 0220    Specimen:  Blood Updated:  07/09/19 0342     WBC 12.90 10*3/mm3      RBC 4.24 10*6/mm3      Hemoglobin 12.5 g/dL      Hematocrit 38.3 %      MCV 90.2 fL      MCH 29.5 pg      MCHC 32.7 g/dL      RDW 13.4 %      RDW-SD 42.0 fl      MPV 7.1 fL      Platelets 240 10*3/mm3      Neutrophil % 88.5 %      Lymphocyte % 5.2 %      Monocyte % 6.1 %      Eosinophil % 0.0 %      Basophil % 0.2 %      Neutrophils, Absolute 11.50 10*3/mm3      Lymphocytes, Absolute 0.70 10*3/mm3      Monocytes, Absolute 0.80 10*3/mm3      Eosinophils, Absolute 0.00 10*3/mm3      Basophils, Absolute 0.00 10*3/mm3      nRBC 0.0 /100 WBC     Urinalysis With Culture If Indicated - Urine, Catheter [[id number]]  (Abnormal) Collected:  07/08/19 2252    Specimen:  Urine, Catheter Updated:  07/08/19 2318     Color, UA Yellow     Appearance, UA Cloudy     pH, UA 8.5     Specific Gravity, UA 1.019     Glucose, UA Negative     Ketones, UA Negative     Bilirubin, UA Negative     Blood, UA Negative     Protein, UA Negative     Leuk Esterase, UA Negative     Nitrite, UA Negative     Urobilinogen, UA  1.0 E.U./dL    Narrative:       Urine microscopic not indicated.    [place] Draw [[id number]] Collected:  07/08/19 2053    Specimen:  Blood Updated:  07/08/19 2201    Narrative:       The following orders were created for panel order [place] Draw.  Procedure                               Abnormality         Status                     ---------                               -----------         ------                     Light Blue Top[[id number]]                                                              Green Top (Gel)[[id number]]                                  Final result               Lavender Top[[id number]]                                     Final result               Gold Top - SST[[id number]]                                   Final result                 Please view results for these tests on the individual orders.    Green Top (Gel) [[id number]] Collected:  07/08/19 2053    Specimen:  Blood Updated:  07/08/19 2201     Extra Tube Hold for add-ons.     Comment: Auto resulted.       Lavender Top [[id number]] Collected:  07/08/19 2053    Specimen:  Blood Updated:  07/08/19 2201     Extra Tube hold for add-on     Comment: Auto resulted       Gold Top - SST [[id number]] Collected:  07/08/19 2053    Specimen:  Blood Updated:  07/08/19 2201     Extra Tube Hold for add-ons.     Comment: Auto resulted.       CBC & Differential [[id number]] Collected:  07/08/19 2053    Specimen:  Blood Updated:  07/08/19 2149    Narrative:       The following orders were created for panel order CBC & Differential.  Procedure                               Abnormality         Status                     ---------                               -----------         ------                     Scan Slide[[id number]]                                       Final result               CBC Auto Differential[[id number]]        Abnormal            Final result                 Please view results for these tests on the individual orders.    Scan Slide  [[id number]] Collected:  07/08/19 2053    Specimen:  Blood Updated:  07/08/19 2149     Scan Slide --     Comment: See Manual Differential Results       Manual Differential [[id number]]  (Abnormal) Collected:  07/08/19 2053    Specimen:  Blood Updated:  07/08/19 2149     Neutrophil % 69.0 %      Lymphocyte % 10.0 %      Eosinophil % 1.0 %      Bands %  20.0 %      Neutrophils Absolute 10.24 10*3/mm3      Lymphocytes Absolute 1.15 10*3/mm3      Eosinophils Absolute 0.12 10*3/mm3      RBC Morphology Normal     WBC Morphology Normal     Platelet Morphology Normal    CBC Auto Differential [[id number]]  (Abnormal) Collected:  07/08/19 2053    Specimen:  Blood Updated:  07/08/19 2149     WBC 11.50 10*3/mm3      RBC 4.84 10*6/mm3      Hemoglobin 14.5 g/dL      Hematocrit 42.6 %      MCV 88.1 fL      MCH 29.9 pg      MCHC 33.9 g/dL      RDW 13.4 %      RDW-SD 41.6 fl      MPV 7.0 fL      Platelets 299 10*3/mm3     Comprehensive Metabolic Panel [[id number]]  (Abnormal) Collected:  07/08/19 2053    Specimen:  Blood Updated:  07/08/19 2126     Glucose 127 mg/dL      BUN 15 mg/dL      Creatinine 0.70 mg/dL      Sodium 140 mmol/L      Potassium 3.5 mmol/L      Chloride 104 mmol/L      CO2 23.0 mmol/L      Calcium 9.5 mg/dL      Total Protein 7.5 g/dL      Albumin 4.30 g/dL      ALT (SGPT) 87 U/L      AST (SGOT) 168 U/L      Alkaline Phosphatase 416 U/L      Total Bilirubin 1.3 mg/dL      eGFR Non African Amer 83 mL/min/1.73      Globulin 3.2 gm/dL      A/G Ratio 1.3 g/dL      BUN/Creatinine Ratio 21.4     Anion Gap 16.5 mmol/L     Lipase [[id number]]  (Abnormal) Collected:  07/08/19 2053    Specimen:  Blood Updated:  07/08/19 2126     Lipase 56 U/L           Imaging Results (last 24 hours)     Procedure Component Value Units Date/Time    CT Abdomen Pelvis With Contrast [[id number]] Collected:  07/08/19 2256     Updated:  07/08/19 2302    Narrative:          DATE OF EXAM:  7/8/2019 10:17 PM     PROCEDURE:  CT ABDOMEN PELVIS W  CONTRAST-     INDICATIONS:   Upper quadrant pain history of pancreatitis     COMPARISON:   No Comparisons Available     TECHNIQUE:  Routine transaxial slices were obtained through the abdomen and pelvis  after the intravenous administration of 100 mL of Isovue 370.  Reconstructed coronal and sagittal images were also obtained. Automated  exposure control and iterative construction methods were used.     FINDINGS:  The lung bases are clear. The patient is status post cholecystectomy.  The right kidney, right adrenal gland, left adrenal gland, left kidney,  spleen and pancreas appear normal.     The patient is status post cholecystectomy. The patient has a prominent  common bile duct with air in the right and left intrahepatic biliary  ducts and in the common bile duct which is prominent. The common bile  duct is dilated up to 2.5 cm near the head of the pancreas which is  increased in size even in a postcholecystectomy patient. Consider  further evaluation. There is no definite mass seen at the head of the  pancreas or the ampullary region, a small mass cannot be excluded.     The urinary bladder, the abdominal and pelvic portions of the colon  appear normal and the small bowel is nondilated.        Impression:       Dilated common bile duct to the level of the ampulla. Gas is seen within  the dilated intrahepatic hepatic bile ducts. There is no evidence of  pancreatitis.     Electronically Signed By-Delbert Cabrera On:7/8/2019 11:00 PM  This report was finalized on [id number] by  Delbert Cabrera, .             ASSESSMENT:    Upper abdominal pain  Nausea with early satiety/anorexia -history of mild gastroparesis  Abnormal LFTs  Abnormal imaging showing biliary dilation  History of cholecystectomy secondary to cholelithiasis  History of choledocholithiasis s/p ERCP and pancreatitis      PLAN:    CT with dilated CBD but no obvious filling defects or evidence of acute pancreatitis.  Abnormal LFTs noted with concern for  ampullary stenosis versus retained sludge/stone.  Will plan ERCP evaluation today for further evaluation.  Keep n.p.o.  Continue PPI.  Could consider Reglan if nausea/anorexia does not improve after ERCP with previous gastric emptying scan showing mild gastroparesis.  Continue supportive care with antiemetics and analgesics as needed.  Further recommendations to follow ERCP findings.    I discussed the patients findings and my recommendations with the patient.  Aletha Leo, APRN  07/09/19  9:47 AM

## 2019-07-09 NOTE — ED NOTES
Pt resting in bed. Awaiting reeval. No acute distress noted.      Sindhu Gomez, RN  07/09/19 0012

## 2019-07-09 NOTE — OP NOTE
ENDOSCOPIC RETROGRADE CHOLANGIOPANCREATOGRAPHY Procedure Report    Patient Name:  Wandy Roberts  YOB: 1950    Date of Surgery:  7/8/2019 - 7/9/2019     Pre-Op Diagnosis:  Abnormal LFTs [R94.5]  Epigastric pain [R10.13]        Procedure/CPT® Codes:      Procedure(s):  ENDOSCOPIC RETROGRADE CHOLANGIOPANCREATOGRAPHY, SPHINCTEROTOMY, CLEARANCE OF BILE DUCT (9MM-12MM), UP TO 12MM, EXTRACTION OF BILIARY STONES WITH BALLOON    Staff:  Surgeon(s):  Kamron Segura MD      Anesthesia: General    Description of Procedure:  A description of the procedure as well as risks, benefits and alternative methods were explained to the patient who voiced understanding and signed the corresponding consent form.Specifically risks of post-ERCP pancreatitis, bleeding, perforation, failure to canulate and adverse reaction to sedation were discussed. A physical exam was performed and vital signs were monitored throughout the procedure.    A  film was performed which was normal. With the patient in the semi-prone position, an Olympus side viewing endoscope was placed into the mouth and proceeded through the esophagus, stomach and second portion of the duodenum without difficulty. Limited views of the esophagus and stomach were normal. The ampulla was visualized and appeared normal. A [employer] hydrotome was used to cannulate the ampulla using wire guided technique. Bile was aspirated to ensure this was the duct of interest. Contrast was injected into the bile duct.      The scope was then retroflexed and the fundus was visualized. The procedure was not difficult and there were no immediate complications.    Findings:   Side-viewing ERCP scope was advanced to regular addition from orifice, esophagus stomach.  Retained food was seen in the stomach.  Ampulla was aligned with a scope.  No obvious mass or growth was seen on the ampulla.  Compression of the common bilateral obtained using a dream tome.  Common bile duct  was markedly dilated at least close to to 2.5 cm.  Multiple filling defect was noticed.  Sphincter RV was extended at 12 o'clock position using a standard to be setting.  After completion of synchrony, 9 to 12 mm extraction balloon was advanced at the common hepatic duct and multiple 3 multiple large stones were removed.  Subsequent occlusion cardiogram did not show any further filling defect.  Patient tolerated procedure very well.  No immediate claudication was noticed.  Pancreatic duct was not cannulated intentionally during this procedure.    Impression:  1.choledocholithiasis, sphincterotomy was extended and multiple CBD stones were removed.    Recommendations:  Follow-up with a liver function test.  Resume other p.o. medication.  Antibiotics.  Add PPI and Reglan.      Kamron Segura MD     Date: 7/9/2019    Time: 12:13 PM

## 2019-07-09 NOTE — ED NOTES
Pt resting in bed. Awaiting reeval. No acute distress noted. [place].      Sindhu Gomez, RN  07/09/19 0011

## 2019-07-09 NOTE — ED NOTES
Pt transported upstairs to inpt room. No acute distress noted.      Sindhu Gomez RN  07/09/19 0046

## 2019-07-09 NOTE — ANESTHESIA PROCEDURE NOTES
Airway  Urgency: elective    Date/Time: 7/9/2019 11:08 AM  End Time:7/9/2019 11:10 AM    General Information and Staff    Patient location: endo OR.  Anesthesiologist: Sarahy Child MD    Indications and Patient Condition  Indications for airway management: airway protection    Preoxygenated: yes  Mask difficulty assessment: 1 - vent by mask    Final Airway Details  Final airway type: endotracheal airway      Successful airway: ETT  Cuffed: yes   Successful intubation technique: direct laryngoscopy  Facilitating devices/methods: cricoid pressure  Endotracheal tube insertion site: oral  Blade: Myah  Blade size: 3  ETT size (mm): 7.0  Cormack-Lehane Classification: grade IIa - partial view of glottis  Placement verified by: chest auscultation and capnometry   Measured from: lips  ETT to lips (cm): 20  Number of attempts at approach: 1

## 2019-07-09 NOTE — ED NOTES
Pt. States she had this same pain last Saturday and it lasted for about 2 hours but then went away. General abd. Tenderness but worsens with palpation in RUQ     Nora Manuel RN  07/09/19 0008

## 2019-07-09 NOTE — H&P
[facility] HOSPITALIST       PCP:  Amanda Nichols MD (with the [facility])  GI:  Dr. Marmolejo      CHIEF COMPLAINT:     Epigastric pain      HISTORY OF PRESENT ILLNESS:    This is a 69-year-old  female with a history of cholelithiasis status post cholecystectomy approximately 23 years ago, and pancreatitis secondary to CBD stone s/p stent placement who presented to the ED on 07/08/2019 with complaint of epigastric pain. The patient states she had some epigastric pain 3 days ago that was intermittent. She states last night the pain returned and was more severe and persistent. She describes the pain as a pressure. She denies radiation of the pain. She reports associated nausea, but denies vomiting. She denies fever or chills. She denies diarrhea. She denies any exacerbating or alleviating factors. She denies any other complaints.    Workup in the ER revealed choledocholithiasis with elevated LFTs and elevated lipase. The patient was given IV Dilaudid, Phenergan, Protonix, and lactated ringers in ER. She was admitted for observation and further evaluation. Gastroenterology was consulted.       Past Medical History:   Diagnosis Date   • Arthritis    • Cancer (CMS/HCC)     skin carnicomas   • Cholelithiasis    • Depression    • Elevated cholesterol    • GERD (gastroesophageal reflux disease)    • Hypertension    • Pancreatitis due to biliary obstruction    • PUD (peptic ulcer disease)      Past Surgical History:   Procedure Laterality Date   • ABDOMINAL SURGERY     • APPENDECTOMY     • BILE DUCT STENT PLACEMENT     • CHOLECYSTECTOMY     • COLONOSCOPY     • FRACTURE SURGERY     • HYSTERECTOMY     • SKIN BIOPSY     • TONSILLECTOMY       Family History   Problem Relation Age of Onset   • Diabetes Mother    • Cancer Father    • Cancer Brother      Social History     Tobacco Use   • Smoking status: Never Smoker   • Smokeless tobacco: Never Used   Substance Use Topics   • Alcohol use: Yes      "Alcohol/week: 0.6 oz     Types: 1 Cans of beer per week   • Drug use: No     Medications Prior to Admission   Medication Sig Dispense Refill Last Dose   • cycloSPORINE (RESTASIS) 0.05 % ophthalmic emulsion Administer 1 drop to both eyes 2 (Two) Times a Day.      • omeprazole (priLOSEC) 20 MG capsule Take 20 mg by mouth 2 (Two) Times a Day.      • ondansetron (ZOFRAN) 4 MG tablet Take 4 mg by mouth Every 8 (Eight) Hours As Needed for Nausea or Vomiting.      • sennosides-docusate sodium (SENOKOT-S) 8.6-50 MG tablet Take 1 tablet by mouth Daily As Needed for Constipation.      • sertraline (ZOLOFT) 100 MG tablet Take 100 mg by mouth Daily.      • traZODone (DESYREL) 50 MG tablet Take 50 mg by mouth Every Night.        Allergies:  Codeine and Penicillins      There is no immunization history on file for this patient.        REVIEW OF SYSTEMS:    Review of Systems   Constitutional: Negative for chills and fever.   Respiratory: Negative for shortness of breath.    Cardiovascular: Negative for chest pain.   Gastrointestinal: Positive for abdominal pain and nausea. Negative for diarrhea and vomiting.   Genitourinary: Negative for difficulty urinating and dysuria.       Vital Signs  Temp:  [97.4 °F (36.3 °C)-98 °F (36.7 °C)] 97.4 °F (36.3 °C)  Heart Rate:  [] 75  Resp:  [15-22] 16  BP: (121-176)/(48-78) 126/72    Flowsheet Rows      First Filed Value   Admission Height  167.6 cm (66\") Documented at 07/08/2019 1957   Admission Weight  69.4 kg (152 lb 16 oz) Documented at 07/08/2019 1957           Physical Exam:  Physical Exam   Constitutional: She is oriented to person, place, and time and well-developed, well-nourished, and in no distress.   HENT:   Head: Normocephalic.   Eyes: Conjunctivae and EOM are normal. Pupils are equal, round, and reactive to light.   Neck: Normal range of motion. Neck supple.   Cardiovascular: Normal rate, regular rhythm, normal heart sounds and intact distal pulses.   Pulmonary/Chest: Effort " normal and breath sounds normal.   Abdominal: Soft. Bowel sounds are hypoactive. There is tenderness in the right upper quadrant and epigastric area.   Musculoskeletal: Normal range of motion. She exhibits no edema.   Neurological: She is alert and oriented to person, place, and time. GCS score is 15.   Skin: Skin is warm and dry. No rash noted. No erythema.   Psychiatric: Mood, memory, affect and judgment normal.         Results Review:   I reviewed the patient's new clinical results.    Lab Results (most recent)     Procedure Component Value Units Date/Time    Comprehensive Metabolic Panel [[id number]]  (Abnormal) Collected:  07/09/19 0224    Specimen:  Blood Updated:  07/09/19 0346     Glucose 116 mg/dL      BUN 13 mg/dL      Creatinine 0.70 mg/dL      Sodium 136 mmol/L      Potassium 4.1 mmol/L      Chloride 105 mmol/L      CO2 21.0 mmol/L      Calcium 8.4 mg/dL      Total Protein 6.2 g/dL      Albumin 3.50 g/dL      ALT (SGPT) 218 U/L      AST (SGOT) 361 U/L      Alkaline Phosphatase 346 U/L      Total Bilirubin 2.2 mg/dL      eGFR Non African Amer 83 mL/min/1.73      Globulin 2.7 gm/dL      A/G Ratio 1.3 g/dL      BUN/Creatinine Ratio 18.6     Anion Gap 14.1 mmol/L     CBC & Differential [[id number]] Collected:  07/09/19 0220    Specimen:  Blood Updated:  07/09/19 0342    Narrative:       The following orders were created for panel order CBC & Differential.  Procedure                               Abnormality         Status                     ---------                               -----------         ------                     CBC Auto Differential[[id number]]        Abnormal            Final result                 Please view results for these tests on the individual orders.    CBC Auto Differential [[id number]]  (Abnormal) Collected:  07/09/19 0220    Specimen:  Blood Updated:  07/09/19 0342     WBC 12.90 10*3/mm3      RBC 4.24 10*6/mm3      Hemoglobin 12.5 g/dL      Hematocrit 38.3 %      MCV 90.2 fL      MCH  29.5 pg      MCHC 32.7 g/dL      RDW 13.4 %      RDW-SD 42.0 fl      MPV 7.1 fL      Platelets 240 10*3/mm3      Neutrophil % 88.5 %      Lymphocyte % 5.2 %      Monocyte % 6.1 %      Eosinophil % 0.0 %      Basophil % 0.2 %      Neutrophils, Absolute 11.50 10*3/mm3      Lymphocytes, Absolute 0.70 10*3/mm3      Monocytes, Absolute 0.80 10*3/mm3      Eosinophils, Absolute 0.00 10*3/mm3      Basophils, Absolute 0.00 10*3/mm3      nRBC 0.0 /100 WBC     Urinalysis With Culture If Indicated - Urine, Catheter [[id number]]  (Abnormal) Collected:  07/08/19 2252    Specimen:  Urine, Catheter Updated:  07/08/19 2318     Color, UA Yellow     Appearance, UA Cloudy     pH, UA 8.5     Specific Gravity, UA 1.019     Glucose, UA Negative     Ketones, UA Negative     Bilirubin, UA Negative     Blood, UA Negative     Protein, UA Negative     Leuk Esterase, UA Negative     Nitrite, UA Negative     Urobilinogen, UA 1.0 E.U./dL    Narrative:       Urine microscopic not indicated.    [place] Draw [[id number]] Collected:  07/08/19 2053    Specimen:  Blood Updated:  07/08/19 2201    Narrative:       The following orders were created for panel order [place] Draw.  Procedure                               Abnormality         Status                     ---------                               -----------         ------                     Light Blue Top[[id number]]                                                              Green Top (Gel)[[id number]]                                  Final result               Lavender Top[[id number]]                                     Final result               Gold Top - [facility][[id number]]                                   Final result                 Please view results for these tests on the individual orders.    Green Top (Gel) [[id number]] Collected:  07/08/19 2053    Specimen:  Blood Updated:  07/08/19 2201     Extra Tube Hold for add-ons.     Comment: Auto resulted.       Lavender Top [[id number]] Collected:   07/08/19 2053    Specimen:  Blood Updated:  07/08/19 2201     Extra Tube hold for add-on     Comment: Auto resulted       Gold Top - [facility] [[id number]] Collected:  07/08/19 2053    Specimen:  Blood Updated:  07/08/19 2201     Extra Tube Hold for add-ons.     Comment: Auto resulted.       CBC & Differential [[id number]] Collected:  07/08/19 2053    Specimen:  Blood Updated:  07/08/19 2149    Narrative:       The following orders were created for panel order CBC & Differential.  Procedure                               Abnormality         Status                     ---------                               -----------         ------                     Scan Slide[[id number]]                                       Final result               CBC Auto Differential[[id number]]        Abnormal            Final result                 Please view results for these tests on the individual orders.    Scan Slide [[id number]] Collected:  07/08/19 2053    Specimen:  Blood Updated:  07/08/19 2149     Scan Slide --     Comment: See Manual Differential Results       Manual Differential [[id number]]  (Abnormal) Collected:  07/08/19 2053    Specimen:  Blood Updated:  07/08/19 2149     Neutrophil % 69.0 %      Lymphocyte % 10.0 %      Eosinophil % 1.0 %      Bands %  20.0 %      Neutrophils Absolute 10.24 10*3/mm3      Lymphocytes Absolute 1.15 10*3/mm3      Eosinophils Absolute 0.12 10*3/mm3      RBC Morphology Normal     WBC Morphology Normal     Platelet Morphology Normal    CBC Auto Differential [[id number]]  (Abnormal) Collected:  07/08/19 2053    Specimen:  Blood Updated:  07/08/19 2149     WBC 11.50 10*3/mm3      RBC 4.84 10*6/mm3      Hemoglobin 14.5 g/dL      Hematocrit 42.6 %      MCV 88.1 fL      MCH 29.9 pg      MCHC 33.9 g/dL      RDW 13.4 %      RDW-SD 41.6 fl      MPV 7.0 fL      Platelets 299 10*3/mm3     Comprehensive Metabolic Panel [[id number]]  (Abnormal) Collected:  07/08/19 2053    Specimen:  Blood Updated:  07/08/19 2126      Glucose 127 mg/dL      BUN 15 mg/dL      Creatinine 0.70 mg/dL      Sodium 140 mmol/L      Potassium 3.5 mmol/L      Chloride 104 mmol/L      CO2 23.0 mmol/L      Calcium 9.5 mg/dL      Total Protein 7.5 g/dL      Albumin 4.30 g/dL      ALT (SGPT) 87 U/L      AST (SGOT) 168 U/L      Alkaline Phosphatase 416 U/L      Total Bilirubin 1.3 mg/dL      eGFR Non African Amer 83 mL/min/1.73      Globulin 3.2 gm/dL      A/G Ratio 1.3 g/dL      BUN/Creatinine Ratio 21.4     Anion Gap 16.5 mmol/L     Lipase [[id number]]  (Abnormal) Collected:  07/08/19 2053    Specimen:  Blood Updated:  07/08/19 2126     Lipase 56 U/L           Imaging Results (most recent)     Procedure Component Value Units Date/Time    CT Abdomen Pelvis With Contrast [[id number]] Collected:  07/08/19 2256     Updated:  07/08/19 2302    Narrative:          DATE OF EXAM:  7/8/2019 10:17 PM     PROCEDURE:  CT ABDOMEN PELVIS W CONTRAST-     INDICATIONS:   Upper quadrant pain history of pancreatitis     COMPARISON:   No Comparisons Available     TECHNIQUE:  Routine transaxial slices were obtained through the abdomen and pelvis  after the intravenous administration of 100 mL of Isovue 370.  Reconstructed coronal and sagittal images were also obtained. Automated  exposure control and iterative construction methods were used.     FINDINGS:  The lung bases are clear. The patient is status post cholecystectomy.  The right kidney, right adrenal gland, left adrenal gland, left kidney,  spleen and pancreas appear normal.     The patient is status post cholecystectomy. The patient has a prominent  common bile duct with air in the right and left intrahepatic biliary  ducts and in the common bile duct which is prominent. The common bile  duct is dilated up to 2.5 cm near the head of the pancreas which is  increased in size even in a postcholecystectomy patient. Consider  further evaluation. There is no definite mass seen at the head of the  pancreas or the ampullary region,  a small mass cannot be excluded.     The urinary bladder, the abdominal and pelvic portions of the colon  appear normal and the small bowel is nondilated.        Impression:       Dilated common bile duct to the level of the ampulla. Gas is seen within  the dilated intrahepatic hepatic bile ducts. There is no evidence of  pancreatitis.     Electronically Signed By-Delbert Cabrera On:7/8/2019 11:00 PM  This report was finalized on [id number] by  Delbert Cabrera, .            ECG/EMG Results (most recent)     None                Assessment/Plan       Choledocholithiasis    Abnormal LFTs    Epigastric pain    Gastroesophageal reflux disease    Depression    Insomnia      Choledocholithiasis  -h/o cholelithiasis s/p cholecystectomy approx. 23 years ago; h/o pancreatitis secondary to CBD stone s/p biliary stent placement  -GI consulted for ERCP with possible stent placement  -NPO   -IVF    Abnormal LFTs  -secondary to above  -monitor and trend    Acute Epigastric pain with nausea  -secondary to above  -Toradol PRN pain  -Zofran and Phenergan PRN nausea    Gastroesophageal reflux disease, h/o PUD  -continue PPI    Depression / Chronic Insomnia  -continue Zoloft and trazodone    VTE Prophylaxis - bilateral SCDs      Electronically signed by REBA Wilkinson, 07/09/19, 9:46 AM.

## 2019-07-09 NOTE — PLAN OF CARE
Problem: Patient Care Overview  Goal: Plan of Care Review  Outcome: Ongoing (interventions implemented as appropriate)   07/09/19 0320   Coping/Psychosocial   Plan of Care Reviewed With patient   Plan of Care Review   Progress no change       Problem: Pain, Chronic (Adult)  Goal: Identify Related Risk Factors and Signs and Symptoms  Outcome: Ongoing (interventions implemented as appropriate)   07/09/19 0320   Pain, Chronic (Adult)   Related Risk Factors (Chronic Pain) disease process   Signs and Symptoms (Chronic Pain) verbalization of pain/discomfort for a prolonged time period     Goal: Acceptable Pain/Comfort Level and Functional Ability  Outcome: Ongoing (interventions implemented as appropriate)   07/09/19 0320   Pain, Chronic (Adult)   Acceptable Pain/Comfort Level and Functional Ability making progress toward outcome

## 2019-07-10 ENCOUNTER — INPATIENT HOSPITAL (OUTPATIENT)
Dept: URBAN - METROPOLITAN AREA HOSPITAL 84 | Facility: HOSPITAL | Age: 69
End: 2019-07-10

## 2019-07-10 VITALS
SYSTOLIC BLOOD PRESSURE: 133 MMHG | TEMPERATURE: 98.1 F | HEART RATE: 69 BPM | OXYGEN SATURATION: 97 % | WEIGHT: 154.32 LBS | BODY MASS INDEX: 24.8 KG/M2 | HEIGHT: 66 IN | RESPIRATION RATE: 14 BRPM | DIASTOLIC BLOOD PRESSURE: 67 MMHG

## 2019-07-10 DIAGNOSIS — R68.81 EARLY SATIETY: ICD-10-CM

## 2019-07-10 DIAGNOSIS — R11.0 NAUSEA: ICD-10-CM

## 2019-07-10 DIAGNOSIS — R63.0 ANOREXIA: ICD-10-CM

## 2019-07-10 DIAGNOSIS — R10.9 UNSPECIFIED ABDOMINAL PAIN: ICD-10-CM

## 2019-07-10 DIAGNOSIS — R79.89 OTHER SPECIFIED ABNORMAL FINDINGS OF BLOOD CHEMISTRY: ICD-10-CM

## 2019-07-10 DIAGNOSIS — K80.50 CALCULUS OF BILE DUCT WITHOUT CHOLANGITIS OR CHOLECYSTITIS W: ICD-10-CM

## 2019-07-10 LAB
ALBUMIN SERPL-MCNC: 3.1 G/DL (ref 3.5–4.8)
ALBUMIN/GLOB SERPL: 1.1 G/DL (ref 1–1.7)
ALP SERPL-CCNC: 292 U/L (ref 32–91)
ALT SERPL W P-5'-P-CCNC: 164 U/L (ref 14–54)
AMYLASE SERPL-CCNC: 27 U/L (ref 36–128)
ANION GAP SERPL CALCULATED.3IONS-SCNC: 12.5 MMOL/L (ref 5–15)
AST SERPL-CCNC: 122 U/L (ref 15–41)
BASOPHILS # BLD AUTO: 0 10*3/MM3 (ref 0–0.2)
BASOPHILS NFR BLD AUTO: 0.2 % (ref 0–1.5)
BILIRUB SERPL-MCNC: 2.2 MG/DL (ref 0.3–1.2)
BUN BLD-MCNC: 6 MG/DL (ref 8–20)
BUN/CREAT SERPL: 8.6 (ref 5.4–26.2)
CALCIUM SPEC-SCNC: 7.9 MG/DL (ref 8.9–10.3)
CHLORIDE SERPL-SCNC: 107 MMOL/L (ref 101–111)
CO2 SERPL-SCNC: 24 MMOL/L (ref 22–32)
CREAT BLD-MCNC: 0.7 MG/DL (ref 0.4–1)
DEPRECATED RDW RBC AUTO: 42.4 FL (ref 37–54)
EOSINOPHIL # BLD AUTO: 0.1 10*3/MM3 (ref 0–0.4)
EOSINOPHIL NFR BLD AUTO: 1.9 % (ref 0.3–6.2)
ERYTHROCYTE [DISTWIDTH] IN BLOOD BY AUTOMATED COUNT: 13.5 % (ref 12.3–15.4)
GFR SERPL CREATININE-BSD FRML MDRD: 83 ML/MIN/1.73
GLOBULIN UR ELPH-MCNC: 2.8 GM/DL (ref 2.5–3.8)
GLUCOSE BLD-MCNC: 110 MG/DL (ref 65–99)
HCT VFR BLD AUTO: 37.1 % (ref 34–46.6)
HGB BLD-MCNC: 12.4 G/DL (ref 12–15.9)
LIPASE SERPL-CCNC: 27 U/L (ref 22–51)
LYMPHOCYTES # BLD AUTO: 1.1 10*3/MM3 (ref 0.7–3.1)
LYMPHOCYTES NFR BLD AUTO: 18.9 % (ref 19.6–45.3)
MCH RBC QN AUTO: 29.8 PG (ref 26.6–33)
MCHC RBC AUTO-ENTMCNC: 33.3 G/DL (ref 31.5–35.7)
MCV RBC AUTO: 89.5 FL (ref 79–97)
MONOCYTES # BLD AUTO: 0.4 10*3/MM3 (ref 0.1–0.9)
MONOCYTES NFR BLD AUTO: 7.1 % (ref 5–12)
NEUTROPHILS # BLD AUTO: 4.1 10*3/MM3 (ref 1.7–7)
NEUTROPHILS NFR BLD AUTO: 71.9 % (ref 42.7–76)
NRBC BLD AUTO-RTO: 0.1 /100 WBC (ref 0–0.2)
PLATELET # BLD AUTO: 230 10*3/MM3 (ref 140–450)
PMV BLD AUTO: 7.1 FL (ref 6–12)
POTASSIUM BLD-SCNC: 3.5 MMOL/L (ref 3.6–5.1)
PROT SERPL-MCNC: 5.9 G/DL (ref 6.1–7.9)
RBC # BLD AUTO: 4.15 10*6/MM3 (ref 3.77–5.28)
SODIUM BLD-SCNC: 140 MMOL/L (ref 136–144)
WBC NRBC COR # BLD: 5.7 10*3/MM3 (ref 3.4–10.8)

## 2019-07-10 PROCEDURE — 99238 HOSP IP/OBS DSCHRG MGMT 30/<: CPT | Performed by: INTERNAL MEDICINE

## 2019-07-10 PROCEDURE — 99232 SBSQ HOSP IP/OBS MODERATE 35: CPT | Performed by: NURSE PRACTITIONER

## 2019-07-10 PROCEDURE — 80053 COMPREHEN METABOLIC PANEL: CPT | Performed by: NURSE PRACTITIONER

## 2019-07-10 PROCEDURE — 85025 COMPLETE CBC W/AUTO DIFF WBC: CPT | Performed by: NURSE PRACTITIONER

## 2019-07-10 PROCEDURE — 83690 ASSAY OF LIPASE: CPT | Performed by: NURSE PRACTITIONER

## 2019-07-10 PROCEDURE — 25010000002 KETOROLAC TROMETHAMINE PER 15 MG: Performed by: NURSE PRACTITIONER

## 2019-07-10 PROCEDURE — 82150 ASSAY OF AMYLASE: CPT | Performed by: NURSE PRACTITIONER

## 2019-07-10 RX ORDER — LEVOFLOXACIN 500 MG/1
500 TABLET, FILM COATED ORAL EVERY 24 HOURS
Status: DISCONTINUED | OUTPATIENT
Start: 2019-07-10 | End: 2019-07-10 | Stop reason: HOSPADM

## 2019-07-10 RX ORDER — URSODIOL 300 MG/1
300 CAPSULE ORAL 2 TIMES DAILY
Qty: 60 CAPSULE | Refills: 1 | Status: SHIPPED | OUTPATIENT
Start: 2019-07-10

## 2019-07-10 RX ORDER — LEVOFLOXACIN 500 MG/1
500 TABLET, FILM COATED ORAL EVERY 24 HOURS
Qty: 5 TABLET | Refills: 0 | Status: SHIPPED | OUTPATIENT
Start: 2019-07-10 | End: 2019-07-15

## 2019-07-10 RX ORDER — URSODIOL 300 MG/1
300 CAPSULE ORAL 2 TIMES DAILY
Status: DISCONTINUED | OUTPATIENT
Start: 2019-07-10 | End: 2019-07-10 | Stop reason: HOSPADM

## 2019-07-10 RX ADMIN — CYCLOSPORINE 1 DROP: 0.5 EMULSION OPHTHALMIC at 08:05

## 2019-07-10 RX ADMIN — SODIUM CHLORIDE, SODIUM LACTATE, POTASSIUM CHLORIDE, AND CALCIUM CHLORIDE 100 ML/HR: 600; 310; 30; 20 INJECTION, SOLUTION INTRAVENOUS at 00:42

## 2019-07-10 RX ADMIN — SODIUM CHLORIDE, PRESERVATIVE FREE 3 ML: 5 INJECTION INTRAVENOUS at 08:01

## 2019-07-10 RX ADMIN — KETOROLAC TROMETHAMINE 15 MG: 15 INJECTION, SOLUTION INTRAMUSCULAR; INTRAVENOUS at 07:58

## 2019-07-10 RX ADMIN — PANTOPRAZOLE SODIUM 40 MG: 40 TABLET, DELAYED RELEASE ORAL at 07:59

## 2019-07-10 RX ADMIN — SERTRALINE 100 MG: 100 TABLET, FILM COATED ORAL at 07:59

## 2019-07-10 RX ADMIN — LEVOFLOXACIN 500 MG: 500 TABLET, FILM COATED ORAL at 12:06

## 2019-07-10 RX ADMIN — URSODIOL 300 MG: 300 CAPSULE ORAL at 10:20

## 2019-07-10 NOTE — DISCHARGE SUMMARY
Date of Admission: 7/8/2019    Date of Discharge:  7/10/2019    Length of stay:  LOS: 0 days     Discharge Diagnosis: choledocholithiasis    Presenting Problem/History of Present Illness  Active Hospital Problems    Diagnosis  POA   • **Choledocholithiasis [K80.50]  Yes   • Depression [F32.9]  Yes   • Insomnia [G47.00]  Yes   • Abnormal LFTs [R94.5]  Yes   • Epigastric pain [R10.13]  Yes   • Gastroesophageal reflux disease [K21.9]  Yes      Resolved Hospital Problems   No resolved problems to display.          Hospital Course  Patient is a 69 y.o. female presented with abd pain and had ERCP for removal of bile duct stones and did well.      Past Medical History:     Past Medical History:   Diagnosis Date   • Arthritis    • Cancer (CMS/HCC)     skin carnicomas   • Cholelithiasis    • Depression    • Elevated cholesterol    • GERD (gastroesophageal reflux disease)    • Hypertension    • Pancreatitis due to biliary obstruction    • PUD (peptic ulcer disease)        Past Surgical History:     Past Surgical History:   Procedure Laterality Date   • ABDOMINAL SURGERY     • APPENDECTOMY     • BILE DUCT STENT PLACEMENT     • CHOLECYSTECTOMY     • COLONOSCOPY     • ERCP N/A 7/9/2019    Procedure: ENDOSCOPIC RETROGRADE CHOLANGIOPANCREATOGRAPHY, SPHINCTEROTOMY, CLEARANCE OF BILE DUCT (9MM-12MM), UP TO 12MM, EXTRACTION OF BILIARY STONES WITH BALLOON;  Surgeon: Kamron Segura MD;  Location: [facility] ENDOSCOPY;  Service: Gastroenterology   • FRACTURE SURGERY     • HYSTERECTOMY     • SKIN BIOPSY     • TONSILLECTOMY         Social History:   Social History     Socioeconomic History   • Marital status:      Spouse name: Not on file   • Number of children: Not on file   • Years of education: Not on file   • Highest education level: Not on file   Tobacco Use   • Smoking status: Never Smoker   • Smokeless tobacco: Never Used   Substance and Sexual Activity   • Alcohol use: Yes     Alcohol/week: 0.6 oz     Types: 1 Cans of beer  per week   • Drug use: No       Procedures Performed    Procedure(s):  ENDOSCOPIC RETROGRADE CHOLANGIOPANCREATOGRAPHY, SPHINCTEROTOMY, CLEARANCE OF BILE DUCT (9MM-12MM), UP TO 12MM, EXTRACTION OF BILIARY STONES WITH BALLOON  -------------------       Consults:   Consults     Date and Time Order Name Status Description    7/9/2019 0139 Inpatient Gastroenterology Consult Completed     7/9/2019 0029 Hospitalist (on-call MD unless specified) Completed           Pertinent Test Results:       Lab Results (most recent)     Procedure Component Value Units Date/Time    CBC & Differential [[id number]] Collected:  07/10/19 0158    Specimen:  Blood Updated:  07/10/19 0342    Narrative:       The following orders were created for panel order CBC & Differential.  Procedure                               Abnormality         Status                     ---------                               -----------         ------                     CBC Auto Differential[[id number]]        Abnormal            Final result                 Please view results for these tests on the individual orders.    CBC Auto Differential [[id number]]  (Abnormal) Collected:  07/10/19 0158    Specimen:  Blood Updated:  07/10/19 0342     WBC 5.70 10*3/mm3      Comment: Result checked        RBC 4.15 10*6/mm3      Hemoglobin 12.4 g/dL      Hematocrit 37.1 %      MCV 89.5 fL      MCH 29.8 pg      MCHC 33.3 g/dL      RDW 13.5 %      RDW-SD 42.4 fl      MPV 7.1 fL      Platelets 230 10*3/mm3      Neutrophil % 71.9 %      Lymphocyte % 18.9 %      Monocyte % 7.1 %      Eosinophil % 1.9 %      Basophil % 0.2 %      Neutrophils, Absolute 4.10 10*3/mm3      Lymphocytes, Absolute 1.10 10*3/mm3      Monocytes, Absolute 0.40 10*3/mm3      Eosinophils, Absolute 0.10 10*3/mm3      Basophils, Absolute 0.00 10*3/mm3      nRBC 0.1 /100 WBC     Comprehensive Metabolic Panel [[id number]]  (Abnormal) Collected:  07/10/19 0158    Specimen:  Blood Updated:  07/10/19 0318     Glucose 110  mg/dL      BUN 6 mg/dL      Creatinine 0.70 mg/dL      Sodium 140 mmol/L      Potassium 3.5 mmol/L      Chloride 107 mmol/L      CO2 24.0 mmol/L      Calcium 7.9 mg/dL      Total Protein 5.9 g/dL      Albumin 3.10 g/dL      ALT (SGPT) 164 U/L      AST (SGOT) 122 U/L      Alkaline Phosphatase 292 U/L      Total Bilirubin 2.2 mg/dL      eGFR Non African Amer 83 mL/min/1.73      Globulin 2.8 gm/dL      A/G Ratio 1.1 g/dL      BUN/Creatinine Ratio 8.6     Anion Gap 12.5 mmol/L     Amylase [[id number]]  (Abnormal) Collected:  07/10/19 0158    Specimen:  Blood Updated:  07/10/19 0315     Amylase 27 U/L     Lipase [[id number]]  (Normal) Collected:  07/10/19 0158    Specimen:  Blood Updated:  07/10/19 0315     Lipase 27 U/L     Comprehensive Metabolic Panel [[id number]]  (Abnormal) Collected:  07/09/19 0224    Specimen:  Blood Updated:  07/09/19 0346     Glucose 116 mg/dL      BUN 13 mg/dL      Creatinine 0.70 mg/dL      Sodium 136 mmol/L      Potassium 4.1 mmol/L      Chloride 105 mmol/L      CO2 21.0 mmol/L      Calcium 8.4 mg/dL      Total Protein 6.2 g/dL      Albumin 3.50 g/dL      ALT (SGPT) 218 U/L      AST (SGOT) 361 U/L      Alkaline Phosphatase 346 U/L      Total Bilirubin 2.2 mg/dL      eGFR Non African Amer 83 mL/min/1.73      Globulin 2.7 gm/dL      A/G Ratio 1.3 g/dL      BUN/Creatinine Ratio 18.6     Anion Gap 14.1 mmol/L     CBC & Differential [[id number]] Collected:  07/09/19 0220    Specimen:  Blood Updated:  07/09/19 0342    Narrative:       The following orders were created for panel order CBC & Differential.  Procedure                               Abnormality         Status                     ---------                               -----------         ------                     CBC Auto Differential[[id number]]        Abnormal            Final result                 Please view results for these tests on the individual orders.    CBC Auto Differential [[id number]]  (Abnormal) Collected:  07/09/19 0220     Specimen:  Blood Updated:  07/09/19 0342     WBC 12.90 10*3/mm3      RBC 4.24 10*6/mm3      Hemoglobin 12.5 g/dL      Hematocrit 38.3 %      MCV 90.2 fL      MCH 29.5 pg      MCHC 32.7 g/dL      RDW 13.4 %      RDW-SD 42.0 fl      MPV 7.1 fL      Platelets 240 10*3/mm3      Neutrophil % 88.5 %      Lymphocyte % 5.2 %      Monocyte % 6.1 %      Eosinophil % 0.0 %      Basophil % 0.2 %      Neutrophils, Absolute 11.50 10*3/mm3      Lymphocytes, Absolute 0.70 10*3/mm3      Monocytes, Absolute 0.80 10*3/mm3      Eosinophils, Absolute 0.00 10*3/mm3      Basophils, Absolute 0.00 10*3/mm3      nRBC 0.0 /100 WBC     Urinalysis With Culture If Indicated - Urine, Catheter [[id number]]  (Abnormal) Collected:  07/08/19 2252    Specimen:  Urine, Catheter Updated:  07/08/19 2318     Color, UA Yellow     Appearance, UA Cloudy     pH, UA 8.5     Specific Gravity, UA 1.019     Glucose, UA Negative     Ketones, UA Negative     Bilirubin, UA Negative     Blood, UA Negative     Protein, UA Negative     Leuk Esterase, UA Negative     Nitrite, UA Negative     Urobilinogen, UA 1.0 E.U./dL    Narrative:       Urine microscopic not indicated.    [facility] Draw [[id number]] Collected:  07/08/19 2053    Specimen:  Blood Updated:  07/08/19 2201    Narrative:       The following orders were created for panel order [facility] Draw.  Procedure                               Abnormality         Status                     ---------                               -----------         ------                     Light Blue Top[[id number]]                                                              Green Top (Gel)[[id number]]                                  Final result               Lavender Top[[id number]]                                     Final result               Gold Top - [facility][[id number]]                                   Final result                 Please view results for these tests on the individual orders.    Green Top (Gel) [[id number]] Collected:   07/08/19 2053    Specimen:  Blood Updated:  07/08/19 2201     Extra Tube Hold for add-ons.     Comment: Auto resulted.       Lavender Top [[id number]] Collected:  07/08/19 2053    Specimen:  Blood Updated:  07/08/19 2201     Extra Tube hold for add-on     Comment: Auto resulted       Gold Top - [facility] [[id number]] Collected:  07/08/19 2053    Specimen:  Blood Updated:  07/08/19 2201     Extra Tube Hold for add-ons.     Comment: Auto resulted.       Scan Slide [[id number]] Collected:  07/08/19 2053    Specimen:  Blood Updated:  07/08/19 2149     Scan Slide --     Comment: See Manual Differential Results       Manual Differential [[id number]]  (Abnormal) Collected:  07/08/19 2053    Specimen:  Blood Updated:  07/08/19 2149     Neutrophil % 69.0 %      Lymphocyte % 10.0 %      Eosinophil % 1.0 %      Bands %  20.0 %      Neutrophils Absolute 10.24 10*3/mm3      Lymphocytes Absolute 1.15 10*3/mm3      Eosinophils Absolute 0.12 10*3/mm3      RBC Morphology Normal     WBC Morphology Normal     Platelet Morphology Normal    Lipase [[id number]]  (Abnormal) Collected:  07/08/19 2053    Specimen:  Blood Updated:  07/08/19 2126     Lipase 56 U/L                   Imaging Results (most recent)     Procedure Component Value Units Date/Time    FL ercp biliary duct only [[id number]] Collected:  07/09/19 1209     Updated:  07/09/19 1212    Narrative:       DATE OF EXAM:  7/9/2019 11:03 AM     PROCEDURE:  FL ERCP BILIARY DUCT ONLY-     INDICATIONS:  ercp procedure; K80.20-Calculus of gallbladder without cholecystitis  without obstruction     COMPARISON:  CT abdomen and pelvis 07/08/2019.     TECHNIQUE:   A series of radiographic digital spot films were obtained in conjunction  with a endoscopic catheterization of the biliary and pancreatic ductal  system, performed by the gastroenterologist.     Fluoroscopic time:   3.4 minutes.     FINDINGS:  Single spot fluoroscopic image was obtained during ERCP performed by  another physician. 3.4  minutes fluoroscopy time was documented.  Cholecystectomy clips are noted.       Impression:       ERCP utilizing fluoroscopy. Please refer to the procedure report for  findings and recommendations.     Electronically Signed By-Dr. Julia Kline MD On:7/9/2019 12:10 PM  This report was finalized on [id number] by Dr. Julia Kline MD.    CT Abdomen Pelvis With Contrast [[id number]] Collected:  07/08/19 2256     Updated:  07/08/19 2302    Narrative:          DATE OF EXAM:  7/8/2019 10:17 PM     PROCEDURE:  CT ABDOMEN PELVIS W CONTRAST-     INDICATIONS:   Upper quadrant pain history of pancreatitis     COMPARISON:   No Comparisons Available     TECHNIQUE:  Routine transaxial slices were obtained through the abdomen and pelvis  after the intravenous administration of 100 mL of Isovue 370.  Reconstructed coronal and sagittal images were also obtained. Automated  exposure control and iterative construction methods were used.     FINDINGS:  The lung bases are clear. The patient is status post cholecystectomy.  The right kidney, right adrenal gland, left adrenal gland, left kidney,  spleen and pancreas appear normal.     The patient is status post cholecystectomy. The patient has a prominent  common bile duct with air in the right and left intrahepatic biliary  ducts and in the common bile duct which is prominent. The common bile  duct is dilated up to 2.5 cm near the head of the pancreas which is  increased in size even in a postcholecystectomy patient. Consider  further evaluation. There is no definite mass seen at the head of the  pancreas or the ampullary region, a small mass cannot be excluded.     The urinary bladder, the abdominal and pelvic portions of the colon  appear normal and the small bowel is nondilated.        Impression:       Dilated common bile duct to the level of the ampulla. Gas is seen within  the dilated intrahepatic hepatic bile ducts. There is no evidence of  pancreatitis.     Electronically  Signed By-Delbert Cabrera On:7/8/2019 11:00 PM  This report was finalized on [id number] by  Delbert Cabrera, .          Condition on Discharge:  good    Vital Signs  Temp:  [97.3 °F (36.3 °C)-98.7 °F (37.1 °C)] 98.1 °F (36.7 °C)  Heart Rate:  [68-91] 69  Resp:  [12-21] 14  BP: (118-155)/(48-71) 133/67    Physical Exam:     General Appearance:    Alert, cooperative, in no acute distress   Lungs:     Clear to auscultation,respirations regular, even and                  unlabored    Heart:    Regular rhythm and normal rate, normal S1 and S2, no            murmur, no gallop, no rub, no click   Abdomen:     Normal bowel sounds, no masses, no organomegaly, soft        non-tender, non-distended, no guarding, no rebound                tenderness   Extremities:   Moves all extremities well, no edema, no cyanosis, no             redness       Discharge Disposition  home    Discharge Medications     Discharge Medications      New Medications      Instructions Start Date   levoFLOXacin 500 MG tablet  Commonly known as:  LEVAQUIN   500 mg, Oral, Every 24 Hours         Continue These Medications      Instructions Start Date   cycloSPORINE 0.05 % ophthalmic emulsion  Commonly known as:  RESTASIS   1 drop, Both Eyes, 2 Times Daily      omeprazole 20 MG capsule  Commonly known as:  priLOSEC   20 mg, Oral, 2 Times Daily      ondansetron 4 MG tablet  Commonly known as:  ZOFRAN   4 mg, Oral, Every 8 Hours PRN      sennosides-docusate sodium 8.6-50 MG tablet  Commonly known as:  SENOKOT-S   1 tablet, Oral, Daily PRN      sertraline 100 MG tablet  Commonly known as:  ZOLOFT   100 mg, Oral, Daily      sucralfate 1 g tablet  Commonly known as:  CARAFATE   1 g, Oral, 4 Times Daily      traZODone 50 MG tablet  Commonly known as:  DESYREL   50 mg, Oral, Nightly             Discharge Diet: soft, bland diet      Activity at Discharge: as tolerated      Follow-up Appointments  No future appointments.      Test Results Pending at Discharge       Risk  for Readmission (LACE) Score: 2 (7/10/2019  6:00 AM)          Donn Sweet Jr., MD  07/10/19  9:49 AM    Time:

## 2019-07-10 NOTE — PLAN OF CARE
Problem: Patient Care Overview  Goal: Individualization and Mutuality  Outcome: Ongoing (interventions implemented as appropriate)    Goal: Discharge Needs Assessment  Outcome: Ongoing (interventions implemented as appropriate)   07/10/19 0119   Discharge Needs Assessment   Patient/Family Anticipates Transition to home with family   Patient/Family Anticipated Services at Transition none   Transportation Concerns car, none   Transportation Anticipated family or friend will provide       Problem: Pain, Chronic (Adult)  Goal: Identify Related Risk Factors and Signs and Symptoms  Outcome: Ongoing (interventions implemented as appropriate)   07/10/19 0119   Pain, Chronic (Adult)   Related Risk Factors (Chronic Pain) procedures/treatments;disease process   Signs and Symptoms (Chronic Pain) verbalization of pain descriptors     Goal: Acceptable Pain/Comfort Level and Functional Ability  Outcome: Ongoing (interventions implemented as appropriate)

## 2019-07-10 NOTE — DISCHARGE INSTRUCTIONS
Levofloxacin tablets  What is this medicine?  LEVOFLOXACIN (jin cabrera DIOGENES a sin) is a quinolone antibiotic. It is used to treat certain kinds of bacterial infections. It will not work for colds, flu, or other viral infections.  This medicine may be used for other purposes; ask your health care provider or pharmacist if you have questions.  COMMON BRAND NAME(S): Levaquin, Levaquin Leva-Jacob  What should I tell my health care provider before I take this medicine?  They need to know if you have any of these conditions:  -bone problems  -diabetes  -heart disease  -high blood pressure  -history of irregular heartbeat  -history of low levels of potassium in the blood  -joint problems  -kidney disease  -liver disease  -myasthenia gravis  -seizures  -tendon problems  -tingling of the fingers or toes, or other nerve disorder  -an unusual or allergic reaction to levofloxacin, other quinolone antibiotics, foods, dyes, or preservatives  -pregnant or trying to get pregnant  -breast-feeding  How should I use this medicine?  Take this medicine by mouth with a full glass of water. Follow the directions on the prescription label. You can take it with or without food. If it upsets your stomach, take it with food. Take your medicine at regular intervals. Do not take your medicine more often than directed. Take all of your medicine as directed even if you think you are better. Do not skip doses or stop your medicine early.  Avoid antacids, calcium, iron, and zinc products for 2 hours before and 2 hours after taking a dose of this medicine.  A special MedGuide will be given to you by the pharmacist with each prescription and refill. Be sure to read this information carefully each time.  Talk to your pediatrician regarding the use of this medicine in children. While this drug may be prescribed for children as young as 6 months for selected conditions, precautions do apply.  Overdosage: If you think you have taken too much of this medicine  contact a poison control center or emergency room at once.  NOTE: This medicine is only for you. Do not share this medicine with others.  What if I miss a dose?  If you miss a dose, take it as soon as you remember. If it is almost time for your next dose, take only that dose. Do not take double or extra doses.  What may interact with this medicine?  Do not take this medicine with any of the following medications:  -cisapride  -dofetilide  -dronedarone  -pimozide  -thioridazine  -ziprasidone  This medicine may also interact with the following medications:  -antacids  -birth control pills  -certain medicines for diabetes, like glipizide, glyburide, or insulin  -certain medicines that treat or prevent blood clots like warfarin  -didanosine buffered tablets or powder  -multivitamins  -NSAIDS, medicines for pain and inflammation, like ibuprofen or naproxen  -other medicines that prolong the QT interval (cause an abnormal heart rhythm)  -steroid medicines like prednisone or cortisone  -sucralfate  -theophylline  This list may not describe all possible interactions. Give your health care provider a list of all the medicines, herbs, non-prescription drugs, or dietary supplements you use. Also tell them if you smoke, drink alcohol, or use illegal drugs. Some items may interact with your medicine.  What should I watch for while using this medicine?  Tell your doctor or healthcare professional if your symptoms do not start to get better or if they get worse.  Do not treat diarrhea with over the counter products. Contact your doctor if you have diarrhea that lasts more than 2 days or if it is severe and watery.  Check with your doctor or health care professional if you get an attack of severe diarrhea, nausea and vomiting, or if you sweat a lot. The loss of too much body fluid can make it dangerous for you to take this medicine.  This medicine may affect blood sugar levels. If you have diabetes, check with your doctor or health  care professional before you change your diet or the dose of your diabetic medicine.  You may get drowsy or dizzy. Do not drive, use machinery, or do anything that needs mental alertness until you know how this medicine affects you. Do not sit or stand up quickly, especially if you are an older patient. This reduces the risk of dizzy or fainting spells.  This medicine can make you more sensitive to the sun. Keep out of the sun. If you cannot avoid being in the sun, wear protective clothing and use a sunscreen. Do not use sun lamps or tanning beds/booths.  What side effects may I notice from receiving this medicine?  Side effects that you should report to your doctor or health care professional as soon as possible:  -allergic reactions like skin rash or hives, swelling of the face, lips, or tongue  -anxious  -back pain  -bloody or watery diarrhea  -breathing problems  -chest pain  -confusion  -depressed mood  -fast, irregular heartbeat  -fever  -hallucination, loss of contact with reality  -joint, muscle, or tendon pain or swelling  -loss of memory  -muscle weakness  -pain, tingling, numbness in the hands or feet  -seizures  -signs and symptoms of high blood sugar such as dizziness; dry mouth; dry skin; fruity breath; nausea; stomach pain; increased hunger or thirst; increased urination  -signs and symptoms of liver injury like dark yellow or brown urine; general ill feeling or flu-like symptoms; light-colored stools; loss of appetite; nausea; right upper belly pain; unusually weak or tired; yellowing of the eyes or skin  -signs and symptoms of low blood sugar such as feeling anxious; confusion; dizziness; increased hunger; unusually weak or tired; sweating; shakiness; cold; irritable; headache; blurred vision; fast heartbeat; loss of consciousness; pale skin  -stomach pain  -suicidal thoughts or other mood changes  -sunburn  -unusually weak or tired  Side effects that usually do not require medical attention (report  to your doctor or health care professional if they continue or are bothersome):  -constipation  -dry mouth  -headache  -nausea, vomiting  -trouble sleeping  This list may not describe all possible side effects. Call your doctor for medical advice about side effects. You may report side effects to FDA at 6-592-FDA-9641.  Where should I keep my medicine?  Keep out of the reach of children.  Store at room temperature between 15 and 30 degrees C (59 and 86 degrees F). Keep in a tightly closed container. Throw away any unused medicine after the expiration date.  NOTE: This sheet is a summary. It may not cover all possible information. If you have questions about this medicine, talk to your doctor, pharmacist, or health care provider.  © 2019 [employer] (2018-12-20 20:42:42)      Ursodeoxycholic Acid, Ursodiol capsules or tablets  What is this medicine?  URSODIOL (ER jacki dye ol) helps dissolve gallstones in patients who cannot have or who do not need gallbladder surgery. This medicine is also useful for certain liver diseases of adults, children and infants.  This medicine may be used for other purposes; ask your health care provider or pharmacist if you have questions.  COMMON BRAND NAME(S): Actigall, Cipriano 250, Cipriano Forte  What should I tell my health care provider before I take this medicine?  They need to know if you have any of these conditions:  -blocked bile duct or fistula  -pancreatitis  -an unusual or allergic reaction to ursodiol, bile acids, other medicines, foods, dyes, or preservatives  -pregnant or trying to get pregnant  -breast-feeding  How should I use this medicine?  Take this medicine by mouth with a glass of water. Follow the directions on the prescription label. Take your doses at regular intervals. Do not take your medicine more often than directed. Do not stop taking except on the advice of your doctor or health care professional.  Talk to your pediatrician regarding the use of this  medicine in children. Special care may be needed.  Overdosage: If you think you have taken too much of this medicine contact a poison control center or emergency room at once.  NOTE: This medicine is only for you. Do not share this medicine with others.  What if I miss a dose?  If you miss a dose, take it as soon as you can. If it is almost time for your next dose, take only that dose. Do not take double or extra doses.  What may interact with this medicine?  -antacids  -cholestyramine  -clofibrate, fenofibrate, or gemfibrozil  -colesevelam  -colestipol  -female hormones, including estrogens or birth control pills  This list may not describe all possible interactions. Give your health care provider a list of all the medicines, herbs, non-prescription drugs, or dietary supplements you use. Also tell them if you smoke, drink alcohol, or use illegal drugs. Some items may interact with your medicine.  What should I watch for while using this medicine?  Visit your doctor or health care professional for regular checks on your progress. It may take months of therapy to get the right response. Your doctor or health care professional will schedule tests to see if your gallstones are dissolving or if your liver problem is improving. You may also need blood work done while you are taking this medicine to check that your liver is working properly. Report continued or worsened nausea, vomiting, or abdominal pain to your doctor.  Antacids may interfere with the absorption of this medicine. Take this medicine at least 1 hour before or 2 hours after an antacid dose.  What side effects may I notice from receiving this medicine?  Side effects that you should report to your doctor or health care professional as soon as possible:  -allergic reactions like skin rash, itching or hives, swelling of the face, lips, or tongue  -severe stomach area pain, especially toward your right side  Side effects that usually do not require medical  attention (report to your doctor or health care professional if they continue or are bothersome):  -constipation  -gas  -indigestion  -nausea  This list may not describe all possible side effects. Call your doctor for medical advice about side effects. You may report side effects to FDA at 2-582-FDA-7512.  Where should I keep my medicine?  Keep out of the reach of children.  Store at room temperature between 15 and 30 degrees C (59 and 86 degrees F). Keep container tightly closed. Throw away any unused medicine after the expiration date.  NOTE: This sheet is a summary. It may not cover all possible information. If you have questions about this medicine, talk to your doctor, pharmacist, or health care provider.  © 2019 [employer] Standard (2013-07-15 10:26:07)        Cholelithiasis  Cholelithiasis is a form of gallbladder disease in which gallstones form in the gallbladder. The gallbladder is an organ that stores bile. Bile is made in the liver, and it helps to digest fats. Gallstones begin as small crystals and slowly grow into stones. They may cause no symptoms until the gallbladder tightens (contracts) and a gallstone is blocking the duct (gallbladder attack), which can cause pain. Cholelithiasis is also referred to as gallstones.  There are two main types of gallstones:  · Cholesterol stones. These are made of hardened cholesterol and are usually yellow-green in color. They are the most common type of gallstone. Cholesterol is a white, waxy, fat-like substance that is made in the liver.  · Pigment stones. These are dark in color and are made of a red-yellow substance that forms when hemoglobin from red blood cells breaks down (bilirubin).    What are the causes?  This condition may be caused by an imbalance in the substances that bile is made of. This can happen if the bile:  · Has too much bilirubin.  · Has too much cholesterol.  · Does not have enough bile salts. These salts help the body absorb and digest  fats.    In some cases, this condition can also be caused by the gallbladder not emptying completely or often enough.  What increases the risk?  The following factors may make you more likely to develop this condition:  · Being female.  · Having multiple pregnancies. Health care providers sometimes advise removing diseased gallbladders before future pregnancies.  · Eating a diet that is heavy in fried foods, fat, and refined carbohydrates, like white bread and white rice.  · Being obese.  · Being older than age 40.  · Prolonged use of medicines that contain female hormones (estrogen).  · Having diabetes mellitus.  · Rapidly losing weight.  · Having a family history of gallstones.  · Being of  or [place] descent.  · Having an intestinal disease such as Crohn disease.  · Having metabolic syndrome.  · Having cirrhosis.  · Having severe types of anemia such as sickle cell anemia.    What are the signs or symptoms?  In most cases, there are no symptoms. These are known as silent gallstones. If a gallstone blocks the bile ducts, it can cause a gallbladder attack. The main symptom of a gallbladder attack is sudden pain in the upper right abdomen. The pain usually comes at night or after eating a large meal. The pain can last for one or several hours and can spread to the right shoulder or chest.  If the bile duct is blocked for more than a few hours, it can cause infection or inflammation of the gallbladder, liver, or pancreas, which may cause:  · Nausea.  · Vomiting.  · Abdominal pain that lasts for 5 hours or more.  · Fever or chills.  · Yellowing of the skin or the whites of the eyes (jaundice).  · Dark urine.  · Light-colored stools.    How is this diagnosed?  This condition may be diagnosed based on:  · A physical exam.  · Your medical history.  · An ultrasound of your gallbladder.  · CT scan.  · MRI.  · Blood tests to check for signs of infection or inflammation.  · A scan of your gallbladder and bile  ducts (biliary system) using nonharmful radioactive material and special cameras that can see the radioactive material (cholescintigram). This test checks to see how your gallbladder contracts and whether bile ducts are blocked.  · Inserting a small tube with a camera on the end (endoscope) through your mouth to inspect bile ducts and check for blockages (endoscopic retrograde cholangiopancreatogram).    How is this treated?  Treatment for gallstones depends on the severity of the condition. Silent gallstones do not need treatment. If the gallstones cause a gallbladder attack or other symptoms, treatment may be required. Options for treatment include:  · Surgery to remove the gallbladder (cholecystectomy). This is the most common treatment.  · Medicines to dissolve gallstones. These are most effective at treating small gallstones. You may need to take medicines for up to 6-12 months.  · Shock wave treatment (extracorporeal biliary lithotripsy). In this treatment, an ultrasound machine sends shock waves to the gallbladder to break gallstones into smaller pieces. These pieces can then be passed into the intestines or be dissolved by medicine. This is rarely used.  · Removing gallstones through endoscopic retrograde cholangiopancreatogram. A small basket can be attached to the endoscope and used to capture and remove gallstones.    Follow these instructions at home:  · Take over-the-counter and prescription medicines only as told by your health care provider.  · Maintain a healthy weight and follow a healthy diet. This includes:  ? Reducing fatty foods, such as fried food.  ? Reducing refined carbohydrates, like white bread and white rice.  ? Increasing fiber. Aim for foods like almonds, fruit, and beans.  · Keep all follow-up visits as told by your health care provider. This is important.  Contact a health care provider if:  · You think you have had a gallbladder attack.  · You have been diagnosed with silent  gallstones and you develop abdominal pain or indigestion.  Get help right away if:  · You have pain from a gallbladder attack that lasts for more than 2 hours.  · You have abdominal pain that lasts for more than 5 hours.  · You have a fever or chills.  · You have persistent nausea and vomiting.  · You develop jaundice.  · You have dark urine or light-colored stools.  Summary  · Cholelithiasis (also called gallstones) is a form of gallbladder disease in which gallstones form in the gallbladder.  · This condition is caused by an imbalance in the substances that make up bile. This can happen if the bile has too much cholesterol, too much bilirubin, or not enough bile salts.  · You are more likely to develop this condition if you are female, pregnant, using medicines with estrogen, obese, older than age 40, or have a family history of gallstones. You may also develop gallstones if you have diabetes, an intestinal disease, cirrhosis, or metabolic syndrome.  · Treatment for gallstones depends on the severity of the condition. Silent gallstones do not need treatment.  · If gallstones cause a gallbladder attack or other symptoms, treatment may be needed. The most common treatment is surgery to remove the gallbladder.  This information is not intended to replace advice given to you by your health care provider. Make sure you discuss any questions you have with your health care provider.  Document Released: 12/14/2006 Document Revised: 06/18/2018 Document Reviewed: 09/03/2017  [employer] Patient Education © 2019 [employer]

## 2019-07-10 NOTE — PROGRESS NOTES
" LOS: 0 days   Patient Care Team:  Amanda Nichols MD as PCP - General      Subjective \"feeling so much better\"    Interval History:     Subjective: Feeling better with significantly less abdominal pain and no further nausea or vomiting.  Tolerating clear liquid diet without difficulty.  No fevers or chills.  No chest pain or increased shortness of breath.  No new complaints.    ROS:   No chest pain, shortness of breath, or cough.        Medication Review:     Current Facility-Administered Medications:   •  cycloSPORINE (RESTASIS) 0.05 % ophthalmic emulsion 1 drop, 1 drop, Both Eyes, BID, Efrain-Short, Isamar, APRN, 1 drop at 07/10/19 0805  •  ketorolac (TORADOL) injection 15 mg, 15 mg, Intravenous, Q6H PRN, Efrain-Short, Isamar, APRN, 15 mg at 07/10/19 0758  •  lactated ringers infusion, 100 mL/hr, Intravenous, Continuous, Efrain-Short, Isamar, APRN, Last Rate: 100 mL/hr at 07/10/19 0625, 100 mL/hr at 07/10/19 0625  •  levoFLOXacin (LEVAQUIN) tablet 500 mg, 500 mg, Oral, Q24H, Donn Sweet Jr., MD  •  ondansetron (ZOFRAN) tablet 4 mg, 4 mg, Oral, Q8H PRN, Efrain-Short, Isamar, APRN  •  pantoprazole (PROTONIX) EC tablet 40 mg, 40 mg, Oral, QAM, Efrain-Short, Isamar, APRN, 40 mg at 07/10/19 0759  •  promethazine (PHENERGAN) IVPB 12.5 mg, 12.5 mg, Intravenous, Q6H PRN, Efrain-Short, Isamar, APRN  •  senna (SENOKOT) tablet 1 tablet, 1 tablet, Oral, Nightly PRN, Efrain-Short, Isamar, APRN  •  sertraline (ZOLOFT) tablet 100 mg, 100 mg, Oral, Daily, Efrain-Short, Isamar, APRN, 100 mg at 07/10/19 0759  •  sodium chloride 0.9 % flush 3 mL, 3 mL, Intravenous, Q12H, Efrain-Short, Isamar, APRN, 3 mL at 07/10/19 0801  •  sodium chloride 0.9 % flush 3-10 mL, 3-10 mL, Intravenous, PRN, Isamar Hoyt APRN  •  sodium chloride 0.9 % infusion, 50 mL/hr, Intravenous, Continuous, Kami Erickson MD, Stopped at 07/09/19 1340  •  traZODone (DESYREL) tablet 50 mg, 50 mg, Oral, Nightly, Isamar Hoyt APRN, 50 mg at 07/09/19 2009  •  ursodiol " (ACTIGALL) capsule 300 mg, 300 mg, Oral, BID, Aletha Leo, APRN, 300 mg at 07/10/19 1020  •  zolpidem (AMBIEN) tablet 5 mg, 5 mg, Oral, Nightly PRN, Donn Sweet Jr., MD, 5 mg at 07/09/19 7244      Objective -Resting in bed, no acute distress, family at bedside    Vital Signs  Temp:  [97.3 °F (36.3 °C)-98.7 °F (37.1 °C)] 98.1 °F (36.7 °C)  Heart Rate:  [68-91] 69  Resp:  [13-21] 14  BP: (118-155)/(48-71) 133/67  Physical Exam:    General Appearance:    Awake and alert, in no acute distress   Head:    Normocephalic, without obvious abnormality   Eyes:          Conjunctivae normal, anicteric sclera   Ears:    Hearing intact   Throat:   No oral lesions, no thrush, oral mucosa moist   Neck:   supple, no JVD   Lungs:     respirations regular, even and unlabored    Heart:    Regular rhythm and normal rate, normal S1 and S2,    Abdomen:     soft, non-tender, no rebound or guarding, non-distended,   Rectal:     Deferred   Extremities:   No edema, no cyanosis, no redness   Skin:   No bleeding, bruising or rash, no jaundice   Neurologic:   Cranial nerves 2 - 12 grossly intact, sensation   intact        Results Review:    Lab Results (last 24 hours)     Procedure Component Value Units Date/Time    CBC & Differential [[id number]] Collected:  07/10/19 0158    Specimen:  Blood Updated:  07/10/19 0342    Narrative:       The following orders were created for panel order CBC & Differential.  Procedure                               Abnormality         Status                     ---------                               -----------         ------                     CBC Auto Differential[[id number]]        Abnormal            Final result                 Please view results for these tests on the individual orders.    CBC Auto Differential [[id number]]  (Abnormal) Collected:  07/10/19 0158    Specimen:  Blood Updated:  07/10/19 0342     WBC 5.70 10*3/mm3      Comment: Result checked        RBC 4.15 10*6/mm3      Hemoglobin 12.4  g/dL      Hematocrit 37.1 %      MCV 89.5 fL      MCH 29.8 pg      MCHC 33.3 g/dL      RDW 13.5 %      RDW-SD 42.4 fl      MPV 7.1 fL      Platelets 230 10*3/mm3      Neutrophil % 71.9 %      Lymphocyte % 18.9 %      Monocyte % 7.1 %      Eosinophil % 1.9 %      Basophil % 0.2 %      Neutrophils, Absolute 4.10 10*3/mm3      Lymphocytes, Absolute 1.10 10*3/mm3      Monocytes, Absolute 0.40 10*3/mm3      Eosinophils, Absolute 0.10 10*3/mm3      Basophils, Absolute 0.00 10*3/mm3      nRBC 0.1 /100 WBC     Comprehensive Metabolic Panel [[id number]]  (Abnormal) Collected:  07/10/19 0158    Specimen:  Blood Updated:  07/10/19 0318     Glucose 110 mg/dL      BUN 6 mg/dL      Creatinine 0.70 mg/dL      Sodium 140 mmol/L      Potassium 3.5 mmol/L      Chloride 107 mmol/L      CO2 24.0 mmol/L      Calcium 7.9 mg/dL      Total Protein 5.9 g/dL      Albumin 3.10 g/dL      ALT (SGPT) 164 U/L      AST (SGOT) 122 U/L      Alkaline Phosphatase 292 U/L      Total Bilirubin 2.2 mg/dL      eGFR Non African Amer 83 mL/min/1.73      Globulin 2.8 gm/dL      A/G Ratio 1.1 g/dL      BUN/Creatinine Ratio 8.6     Anion Gap 12.5 mmol/L     Amylase [[id number]]  (Abnormal) Collected:  07/10/19 0158    Specimen:  Blood Updated:  07/10/19 0315     Amylase 27 U/L     Lipase [[id number]]  (Normal) Collected:  07/10/19 0158    Specimen:  Blood Updated:  07/10/19 0315     Lipase 27 U/L           Imaging Results (last 24 hours)     Procedure Component Value Units Date/Time    FL ercp biliary duct only [[id number]] Collected:  07/09/19 1209     Updated:  07/09/19 1212    Narrative:       DATE OF EXAM:  7/9/2019 11:03 AM     PROCEDURE:  FL ERCP BILIARY DUCT ONLY-     INDICATIONS:  ercp procedure; K80.20-Calculus of gallbladder without cholecystitis  without obstruction     COMPARISON:  CT abdomen and pelvis 07/08/2019.     TECHNIQUE:   A series of radiographic digital spot films were obtained in conjunction  with a endoscopic catheterization of the biliary  and pancreatic ductal  system, performed by the gastroenterologist.     Fluoroscopic time:   3.4 minutes.     FINDINGS:  Single spot fluoroscopic image was obtained during ERCP performed by  another physician. 3.4 minutes fluoroscopy time was documented.  Cholecystectomy clips are noted.       Impression:       ERCP utilizing fluoroscopy. Please refer to the procedure report for  findings and recommendations.     Electronically Signed By-Dr. Julia Kline MD On:7/9/2019 12:10 PM  This report was finalized on [id number] by Dr. Julia Kline MD.            ASSESSMENT:    Upper abdominal pain -recurrent choledocholithiasis  Nausea with early satiety/anorexia -history of mild gastroparesis  Abnormal LFTs -improving  Abnormal imaging showing biliary dilation  History of cholecystectomy secondary to cholelithiasis  History of choledocholithiasis s/p ERCP and pancreatitis        PLAN:     ERCP yesterday with choledocholithiasis status post sphincterotomy extension and multiple stone removal.  No stent was placed.  LFTs improving and lipase normal.  Significantly less abdominal pain without further vomiting.  Okay to advance diet to low-fat as tolerated.  Continue PPI.  Will start ursodiol for short outpatient treatment.  Would recommend continue antibiotic use x5 days.  If tolerating diet and otherwise medically stable, okay to discharge home later today from the GI standpoint with outpatient follow-up in 2 weeks.  We will see inpatient as needed, call with questions or concerns.        Aletha Leo, REBA  07/10/19  10:57 AM

## 2019-07-11 ENCOUNTER — READMISSION MANAGEMENT (OUTPATIENT)
Dept: CALL CENTER | Facility: HOSPITAL | Age: 69
End: 2019-07-11

## 2019-07-11 NOTE — OUTREACH NOTE
Prep Survey      Responses   Facility patient discharged from?  [facility]   Is patient eligible?  Yes   Discharge diagnosis  Lap Khadijah   Does the patient have one of the following disease processes/diagnoses(primary or secondary)?  General Surgery   Does the patient have Home health ordered?  No   Is there a DME ordered?  No   Prep survey completed?  Yes          Jayleen Carver RN

## 2019-07-12 ENCOUNTER — READMISSION MANAGEMENT (OUTPATIENT)
Dept: CALL CENTER | Facility: HOSPITAL | Age: 69
End: 2019-07-12

## 2019-08-07 ENCOUNTER — OFFICE (OUTPATIENT)
Dept: URBAN - METROPOLITAN AREA CLINIC 64 | Facility: CLINIC | Age: 69
End: 2019-08-07

## 2019-08-07 VITALS
HEIGHT: 66 IN | SYSTOLIC BLOOD PRESSURE: 124 MMHG | HEART RATE: 80 BPM | WEIGHT: 153 LBS | DIASTOLIC BLOOD PRESSURE: 69 MMHG

## 2019-08-07 DIAGNOSIS — R74.8 ABNORMAL LEVELS OF OTHER SERUM ENZYMES: ICD-10-CM

## 2019-08-07 DIAGNOSIS — R63.0 ANOREXIA: ICD-10-CM

## 2019-08-07 DIAGNOSIS — R11.0 NAUSEA: ICD-10-CM

## 2019-08-07 PROCEDURE — 99214 OFFICE O/P EST MOD 30 MIN: CPT | Performed by: NURSE PRACTITIONER

## 2019-08-07 RX ORDER — ZINC GLUCONATE 50 MG
50 TABLET ORAL
Qty: 30 | Refills: 11 | Status: COMPLETED
Start: 2019-08-07 | End: 2023-03-03

## 2022-05-24 ENCOUNTER — TRANSCRIBE ORDERS (OUTPATIENT)
Dept: ADMINISTRATIVE | Facility: HOSPITAL | Age: 72
End: 2022-05-24

## 2022-05-24 DIAGNOSIS — M25.561 RIGHT KNEE PAIN, UNSPECIFIED CHRONICITY: Primary | ICD-10-CM

## 2022-06-17 ENCOUNTER — HOSPITAL ENCOUNTER (OUTPATIENT)
Dept: MRI IMAGING | Facility: HOSPITAL | Age: 72
Discharge: HOME | End: 2022-06-17
Admitting: INTERNAL MEDICINE

## 2022-06-17 DIAGNOSIS — M25.561 RIGHT KNEE PAIN, UNSPECIFIED CHRONICITY: ICD-10-CM

## 2022-06-17 PROCEDURE — 73721 MRI JNT OF LWR EXTRE W/O DYE: CPT

## 2022-06-23 ENCOUNTER — APPOINTMENT (OUTPATIENT)
Dept: MRI IMAGING | Facility: HOSPITAL | Age: 72
End: 2022-06-23

## 2023-03-04 ENCOUNTER — APPOINTMENT (OUTPATIENT)
Dept: CT IMAGING | Facility: HOSPITAL | Age: 73
End: 2023-03-04
Payer: MEDICARE

## 2023-03-04 ENCOUNTER — HOSPITAL ENCOUNTER (EMERGENCY)
Facility: HOSPITAL | Age: 73
Discharge: HOME | End: 2023-03-04
Attending: EMERGENCY MEDICINE | Admitting: EMERGENCY MEDICINE
Payer: MEDICARE

## 2023-03-04 VITALS
TEMPERATURE: 97.8 F | SYSTOLIC BLOOD PRESSURE: 127 MMHG | HEIGHT: 66 IN | OXYGEN SATURATION: 94 % | HEART RATE: 78 BPM | BODY MASS INDEX: 23.14 KG/M2 | DIASTOLIC BLOOD PRESSURE: 66 MMHG | WEIGHT: 143.96 LBS | RESPIRATION RATE: 18 BRPM

## 2023-03-04 DIAGNOSIS — R11.0 NAUSEA: ICD-10-CM

## 2023-03-04 DIAGNOSIS — R10.31 RIGHT LOWER QUADRANT PAIN: Primary | ICD-10-CM

## 2023-03-04 LAB
ALBUMIN SERPL-MCNC: 4.4 G/DL (ref 3.5–5.2)
ALBUMIN/GLOB SERPL: 2 G/DL
ALP SERPL-CCNC: 79 U/L (ref 39–117)
ALT SERPL W P-5'-P-CCNC: 16 U/L (ref 1–33)
ANION GAP SERPL CALCULATED.3IONS-SCNC: 9 MMOL/L (ref 5–15)
AST SERPL-CCNC: 20 U/L (ref 1–32)
BACTERIA UR QL AUTO: ABNORMAL /HPF
BASOPHILS # BLD AUTO: 0.1 10*3/MM3 (ref 0–0.2)
BASOPHILS NFR BLD AUTO: 1.1 % (ref 0–1.5)
BILIRUB SERPL-MCNC: 0.2 MG/DL (ref 0–1.2)
BILIRUB UR QL STRIP: NEGATIVE
BUN SERPL-MCNC: 21 MG/DL (ref 8–23)
BUN/CREAT SERPL: 32.3 (ref 7–25)
CALCIUM SPEC-SCNC: 8.7 MG/DL (ref 8.6–10.5)
CHLORIDE SERPL-SCNC: 104 MMOL/L (ref 98–107)
CLARITY UR: CLEAR
CO2 SERPL-SCNC: 27 MMOL/L (ref 22–29)
COLOR UR: YELLOW
CREAT SERPL-MCNC: 0.65 MG/DL (ref 0.57–1)
DEPRECATED RDW RBC AUTO: 42 FL (ref 37–54)
EGFRCR SERPLBLD CKD-EPI 2021: 92.5 ML/MIN/1.73
EOSINOPHIL # BLD AUTO: 0.2 10*3/MM3 (ref 0–0.4)
EOSINOPHIL NFR BLD AUTO: 4.3 % (ref 0.3–6.2)
ERYTHROCYTE [DISTWIDTH] IN BLOOD BY AUTOMATED COUNT: 13.4 % (ref 12.3–15.4)
GLOBULIN UR ELPH-MCNC: 2.2 GM/DL
GLUCOSE SERPL-MCNC: 118 MG/DL (ref 65–99)
GLUCOSE UR STRIP-MCNC: NEGATIVE MG/DL
HCT VFR BLD AUTO: 42.5 % (ref 34–46.6)
HGB BLD-MCNC: 13.8 G/DL (ref 12–15.9)
HGB UR QL STRIP.AUTO: NEGATIVE
HYALINE CASTS UR QL AUTO: ABNORMAL /LPF
KETONES UR QL STRIP: ABNORMAL
LEUKOCYTE ESTERASE UR QL STRIP.AUTO: ABNORMAL
LIPASE SERPL-CCNC: 52 U/L (ref 13–60)
LYMPHOCYTES # BLD AUTO: 1.1 10*3/MM3 (ref 0.7–3.1)
LYMPHOCYTES NFR BLD AUTO: 22.4 % (ref 19.6–45.3)
MCH RBC QN AUTO: 29.4 PG (ref 26.6–33)
MCHC RBC AUTO-ENTMCNC: 32.4 G/DL (ref 31.5–35.7)
MCV RBC AUTO: 90.6 FL (ref 79–97)
MONOCYTES # BLD AUTO: 0.4 10*3/MM3 (ref 0.1–0.9)
MONOCYTES NFR BLD AUTO: 8.9 % (ref 5–12)
NEUTROPHILS NFR BLD AUTO: 3.1 10*3/MM3 (ref 1.7–7)
NEUTROPHILS NFR BLD AUTO: 63.3 % (ref 42.7–76)
NITRITE UR QL STRIP: NEGATIVE
NRBC BLD AUTO-RTO: 0 /100 WBC (ref 0–0.2)
PH UR STRIP.AUTO: 5.5 [PH] (ref 5–8)
PLATELET # BLD AUTO: 237 10*3/MM3 (ref 140–450)
PMV BLD AUTO: 6.6 FL (ref 6–12)
POTASSIUM SERPL-SCNC: 4.2 MMOL/L (ref 3.5–5.2)
PROT SERPL-MCNC: 6.6 G/DL (ref 6–8.5)
PROT UR QL STRIP: NEGATIVE
RBC # BLD AUTO: 4.68 10*6/MM3 (ref 3.77–5.28)
RBC # UR STRIP: ABNORMAL /HPF
REF LAB TEST METHOD: ABNORMAL
SODIUM SERPL-SCNC: 140 MMOL/L (ref 136–145)
SP GR UR STRIP: 1.03 (ref 1–1.03)
SQUAMOUS #/AREA URNS HPF: ABNORMAL /HPF
UROBILINOGEN UR QL STRIP: ABNORMAL
WBC # UR STRIP: ABNORMAL /HPF
WBC NRBC COR # BLD: 4.9 10*3/MM3 (ref 3.4–10.8)

## 2023-03-04 PROCEDURE — 83690 ASSAY OF LIPASE: CPT

## 2023-03-04 PROCEDURE — 99283 EMERGENCY DEPT VISIT LOW MDM: CPT

## 2023-03-04 PROCEDURE — 96374 THER/PROPH/DIAG INJ IV PUSH: CPT

## 2023-03-04 PROCEDURE — 74177 CT ABD & PELVIS W/CONTRAST: CPT

## 2023-03-04 PROCEDURE — 25510000001 IOPAMIDOL PER 1 ML: Performed by: EMERGENCY MEDICINE

## 2023-03-04 PROCEDURE — 81001 URINALYSIS AUTO W/SCOPE: CPT

## 2023-03-04 PROCEDURE — 25010000002 HYDROMORPHONE 1 MG/ML SOLUTION

## 2023-03-04 PROCEDURE — 96375 TX/PRO/DX INJ NEW DRUG ADDON: CPT

## 2023-03-04 PROCEDURE — 80053 COMPREHEN METABOLIC PANEL: CPT

## 2023-03-04 PROCEDURE — 85025 COMPLETE CBC W/AUTO DIFF WBC: CPT

## 2023-03-04 PROCEDURE — 25010000002 ONDANSETRON PER 1 MG

## 2023-03-04 RX ORDER — ONDANSETRON 2 MG/ML
4 INJECTION INTRAMUSCULAR; INTRAVENOUS ONCE
Status: COMPLETED | OUTPATIENT
Start: 2023-03-04 | End: 2023-03-04

## 2023-03-04 RX ORDER — NAPROXEN 500 MG/1
500 TABLET ORAL 2 TIMES DAILY PRN
Qty: 20 TABLET | Refills: 0 | Status: SHIPPED | OUTPATIENT
Start: 2023-03-04

## 2023-03-04 RX ORDER — SODIUM CHLORIDE 0.9 % (FLUSH) 0.9 %
10 SYRINGE (ML) INJECTION AS NEEDED
Status: DISCONTINUED | OUTPATIENT
Start: 2023-03-04 | End: 2023-03-04 | Stop reason: HOSPADM

## 2023-03-04 RX ORDER — ONDANSETRON 4 MG/1
4 TABLET, ORALLY DISINTEGRATING ORAL EVERY 8 HOURS PRN
Qty: 12 TABLET | Refills: 0 | Status: SHIPPED | OUTPATIENT
Start: 2023-03-04

## 2023-03-04 RX ADMIN — IOPAMIDOL 100 ML: 755 INJECTION, SOLUTION INTRAVENOUS at 10:51

## 2023-03-04 RX ADMIN — HYDROMORPHONE HYDROCHLORIDE 0.5 MG: 1 INJECTION, SOLUTION INTRAMUSCULAR; INTRAVENOUS; SUBCUTANEOUS at 10:05

## 2023-03-04 RX ADMIN — ONDANSETRON 4 MG: 2 INJECTION INTRAMUSCULAR; INTRAVENOUS at 10:05

## 2023-03-04 NOTE — ED PROVIDER NOTES
Subjective   History of Present Illness  Chief Complaint: Abdominal pain      HPI: Patient is a 73-year-old female presents to the ER today by private vehicle complaining of 3-day history of right lower quadrant pain radiating into her hip and anterior leg.  She states that she noticed that while she was working she was not lifting excessive weight pushing or pulling.  The pain is exacerbated with walking and certain movements.  She has had some nausea she is having no urinary symptoms last bowel movement was this morning was normal for her.  She attempted treatment with ibuprofen with 3 tablets taken this morning prior to arrival she has had no fever, reports history of gallbladder removal as well as hysterectomy chronic medical problems include GERD and hyperlipidemia she has also had a history of pancreatitis following an MRCP.    PCP: Simone    History provided by:  Patient      Review of Systems   Respiratory: Negative for shortness of breath.    Cardiovascular: Negative for chest pain.   Gastrointestinal: Positive for abdominal pain and nausea. Negative for constipation, diarrhea and vomiting.   Genitourinary: Negative for dysuria and frequency.   Musculoskeletal: Negative.    Skin: Negative.    Neurological: Negative for dizziness.       Past Medical History:   Diagnosis Date   • Arthritis    • Cancer (CMS/HCC)     skin carnicomas   • Cholelithiasis    • Depression    • Elevated cholesterol    • GERD (gastroesophageal reflux disease)    • Hypertension    • Pancreatitis due to biliary obstruction    • PUD (peptic ulcer disease)        Allergies   Allergen Reactions   • Codeine Itching   • Penicillins Unknown (See Comments)     Childhood        Past Surgical History:   Procedure Laterality Date   • ABDOMINAL SURGERY     • APPENDECTOMY     • BILE DUCT STENT PLACEMENT     • CHOLECYSTECTOMY     • COLONOSCOPY     • ERCP N/A 7/9/2019    Procedure: ENDOSCOPIC RETROGRADE CHOLANGIOPANCREATOGRAPHY, SPHINCTEROTOMY,  "CLEARANCE OF BILE DUCT (9MM-12MM), UP TO 12MM, EXTRACTION OF BILIARY STONES WITH BALLOON;  Surgeon: Kamron Segura MD;  Location[facility] ENDOSCOPY;  Service: Gastroenterology   • FRACTURE SURGERY     • HYSTERECTOMY     • SKIN BIOPSY     • TONSILLECTOMY         Family History   Problem Relation Age of Onset   • Diabetes Mother    • Cancer Father    • Cancer Brother        Social History     Socioeconomic History   • Marital status:    Tobacco Use   • Smoking status: Never   • Smokeless tobacco: Never   Substance and Sexual Activity   • Alcohol use: Yes     Alcohol/week: 1.0 standard drink     Types: 1 Cans of beer per week   • Drug use: No           Objective   Physical Exam  Vitals reviewed.   Constitutional:       Appearance: She is not ill-appearing.   HENT:      Head: Normocephalic.      Nose: Nose normal.      Mouth/Throat:      Mouth: Mucous membranes are dry.      Pharynx: Oropharynx is clear.   Eyes:      Extraocular Movements: Extraocular movements intact.      Pupils: Pupils are equal, round, and reactive to light.   Cardiovascular:      Pulses: Normal pulses.      Heart sounds: Normal heart sounds.   Pulmonary:      Effort: Pulmonary effort is normal.      Breath sounds: Normal breath sounds.   Abdominal:      General: Bowel sounds are normal.      Palpations: Abdomen is soft.      Tenderness: There is abdominal tenderness.   Musculoskeletal:         General: Normal range of motion.   Skin:     General: Skin is warm and dry.      Capillary Refill: Capillary refill takes less than 2 seconds.      Coloration: Skin is not jaundiced.   Neurological:      General: No focal deficit present.      Mental Status: She is alert and oriented to person, place, and time. Mental status is at baseline.      Motor: No weakness.         Procedures           ED Course      /66   Pulse 78   Temp 97.8 °F (36.6 °C) (Oral)   Resp 18   Ht 167.6 cm (66\")   Wt 65.3 kg (143 lb 15.4 oz)   SpO2 94%   BMI 23.24 " kg/m²   Labs Reviewed   COMPREHENSIVE METABOLIC PANEL - Abnormal; Notable for the following components:       Result Value    Glucose 118 (*)     BUN/Creatinine Ratio 32.3 (*)     All other components within normal limits    Narrative:     GFR Normal >60  Chronic Kidney Disease <60  Kidney Failure <15    The GFR formula is only valid for adults with stable renal function between ages 18 and 70.   URINALYSIS W/ CULTURE IF INDICATED - Abnormal; Notable for the following components:    Ketones, UA Trace (*)     Leuk Esterase, UA Trace (*)     All other components within normal limits    Narrative:     In absence of clinical symptoms, the presence of pyuria, bacteria, and/or nitrites on the urinalysis result does not correlate with infection.   URINALYSIS, MICROSCOPIC ONLY - Abnormal; Notable for the following components:    RBC, UA 0-2 (*)     WBC, UA 0-2 (*)     Squamous Epithelial Cells, UA 3-6 (*)     All other components within normal limits   LIPASE - Normal   CBC WITH AUTO DIFFERENTIAL - Normal   CBC AND DIFFERENTIAL    Narrative:     The following orders were created for panel order CBC & Differential.  Procedure                               Abnormality         Status                     ---------                               -----------         ------                     CBC Auto Differential[[id number]]        Normal              Final result                 Please view results for these tests on the individual orders.     Medications   HYDROmorphone (DILAUDID) injection 0.5 mg (0.5 mg Intravenous Given 3/4/23 1005)   ondansetron (ZOFRAN) injection 4 mg (4 mg Intravenous Given 3/4/23 1005)   iopamidol (ISOVUE-370) 76 % injection 100 mL (100 mL Intravenous Given 3/4/23 1051)     CT Abdomen Pelvis With Contrast    Result Date: 3/4/2023  Impression: 1.No acute process identified within abdomen/pelvis. 2.Sigmoid diverticulosis. Electronically Signed: Ronnie Marley  3/4/2023 11:07 AM EST  Workstation ID:  [id number]                                         Medical Decision Making  Ms. Roberts presented to the emergency room with a 3-day history of right lower quadrant pain, radiating into her leg, it is exacerbated with certain movements she has had associated nausea.  She has had no dysuria hematuria or bowel changes.  Attempted treatment with ibuprofen prior to arrival.  An IV was established and labs were obtained lipase and liver enzymes within normal limits ruling out a pancreatitis, urinalysis negative for urinary tract infection, CT of the abdomen pelvis negative for appendicitis, ureterolithiasis or bowel obstruction, did note diverticulosis without evidence of diverticulitis.  As patient reports that the onset of symptoms occurred while she was cleaning and moving around, increased physical activity, we discussed the ED findings as well as abdominal wall strain, her pain was addressed with a dose of Dilaudid which she reported improvement of symptoms, she had no episodes of vomiting in the emergency room.  A prescription for naproxen was sent to preferred pharmacy we discussed heat and ice to the area for 20 minutes at a time, and following up with primary care.  We also discussed worrisome signs and symptoms to monitor for when return to the emergency room.  CT findings of diverticulosis she was given the information to her gastroenterologist to follow-up with as needed.  Patient gave verbal understanding of ED findings and outpatient follow-up Lam of care.  She was alert oriented nontoxic in appearance with improved symptoms at time of discharge, denied further questions.    This document is intended for medical expert use only. Reading of this document by patients and/or patient's family without participating medical staff guidance may result in misinterpretation and unintended morbidity. Any interpretation of such data is the responsibility of the patient and/or family member responsible for the patient in  concert with their primary or specialist providers, not to be left for sources of online searches such as [employer], [employer] or similar queries. Relying on these approaches to knowledge may result in misinterpretation, misguided goals of care and even death should patients or family members try recommendations outside of the realm of professional medical care in a supervised inpatient environment.     Appropriate PPE worn during exam.    Nausea: acute illness or injury     Details: Stable  Right lower quadrant pain: acute illness or injury  Amount and/or Complexity of Data Reviewed  Independent Historian:      Details: Patient  Labs: ordered. Decision-making details documented in ED Course.  Radiology: ordered and independent interpretation performed. Decision-making details documented in ED Course.      Risk  Prescription drug management.          Final diagnoses:   Right lower quadrant pain   Nausea       ED Disposition  ED Disposition     ED Disposition   Discharge    Condition   Stable    Comment   --             Doc Keyes MD  [address] [place] [address] [phone]    Call       GASTROENTEROLOGY [facility]  [address] [facility] [address] [phone]  Schedule an appointment as soon as possible for a visit   As needed         Medication List      New Prescriptions    naproxen 500 MG EC tablet  Commonly known as: EC NAPROSYN  Take 1 tablet by mouth 2 (Two) Times a Day As Needed for Mild Pain.     ondansetron ODT 4 MG disintegrating tablet  Commonly known as: ZOFRAN-ODT  Place 1 tablet on the tongue Every 8 (Eight) Hours As Needed for Nausea or Vomiting.           Where to Get Your Medications      These medications were sent to [facility] [address] [facility], [place] - [address] - [phone]  - [phone] FX  [address] [facility] [place] [address]    Phone: [phone]   · naproxen 500 MG EC tablet  · ondansetron ODT 4 MG disintegrating  tablet          Nandini Mckeon, APRN  03/04/23 6743

## 2023-03-04 NOTE — DISCHARGE INSTRUCTIONS
Prescriptions for naproxen and Zofran sent to your preferred pharmacy  Alternate heat and ice 20 minutes at a time several times a day    Can follow-up with gastroenterology as needed related to diverticulosis    Follow-up with primary care    Return to the ER for new or worsening symptoms

## 2023-03-23 ENCOUNTER — ON CAMPUS - OUTPATIENT (OUTPATIENT)
Dept: URBAN - METROPOLITAN AREA HOSPITAL 2 | Facility: HOSPITAL | Age: 73
End: 2023-03-23
Payer: OTHER GOVERNMENT

## 2023-03-23 VITALS
OXYGEN SATURATION: 99 % | DIASTOLIC BLOOD PRESSURE: 85 MMHG | SYSTOLIC BLOOD PRESSURE: 134 MMHG | TEMPERATURE: 98.6 F | RESPIRATION RATE: 14 BRPM | RESPIRATION RATE: 16 BRPM | SYSTOLIC BLOOD PRESSURE: 186 MMHG | HEIGHT: 66 IN | HEART RATE: 71 BPM | SYSTOLIC BLOOD PRESSURE: 125 MMHG | DIASTOLIC BLOOD PRESSURE: 69 MMHG | OXYGEN SATURATION: 98 % | HEART RATE: 78 BPM | DIASTOLIC BLOOD PRESSURE: 83 MMHG | RESPIRATION RATE: 18 BRPM | SYSTOLIC BLOOD PRESSURE: 155 MMHG | HEART RATE: 85 BPM | SYSTOLIC BLOOD PRESSURE: 133 MMHG | SYSTOLIC BLOOD PRESSURE: 136 MMHG | RESPIRATION RATE: 15 BRPM | HEART RATE: 76 BPM | HEART RATE: 81 BPM | SYSTOLIC BLOOD PRESSURE: 165 MMHG | DIASTOLIC BLOOD PRESSURE: 65 MMHG | WEIGHT: 140 LBS | SYSTOLIC BLOOD PRESSURE: 147 MMHG | OXYGEN SATURATION: 100 % | DIASTOLIC BLOOD PRESSURE: 68 MMHG | HEART RATE: 70 BPM | HEART RATE: 82 BPM | DIASTOLIC BLOOD PRESSURE: 76 MMHG

## 2023-03-23 DIAGNOSIS — K64.1 SECOND DEGREE HEMORRHOIDS: ICD-10-CM

## 2023-03-23 DIAGNOSIS — Z86.010 PERSONAL HISTORY OF COLONIC POLYPS: ICD-10-CM

## 2023-03-23 DIAGNOSIS — K57.30 DIVERTICULOSIS OF LARGE INTESTINE WITHOUT PERFORATION OR ABS: ICD-10-CM

## 2023-03-23 PROCEDURE — 45378 DIAGNOSTIC COLONOSCOPY: CPT | Mod: 33 | Performed by: INTERNAL MEDICINE

## 2023-04-06 NOTE — OUTREACH NOTE
General Surgery Week 1 Survey      Responses   Facility patient discharged from?  [facility]   Does the patient have one of the following disease processes/diagnoses(primary or secondary)?  General Surgery   Is there a successful TCM telephone encounter documented?  No   Week 1 attempt successful?  Yes   Call start time  1710   Call end time  1715   Discharge diagnosis  Choledocholithiasis, ERCP for removal of bile duct stones.    Is patient permission given to speak with other caregiver?  No   Meds reviewed with patient/caregiver?  Yes   Is the patient having any side effects they believe may be caused by any medication additions or changes?  No   Does the patient have all medications related to this admission filled (includes all antibiotics, pain medications, etc.)  No   What is keeping the patient from filling the prescriptions?  -- [Patient has not picked up antibiotic. ]   Nursing Interventions  Nurse provided patient education [Educated on importance and advised to  today and take full course. ]   Does the patient have a follow up appointment scheduled with their surgeon?  Yes   Has the patient kept scheduled appointments due by today?  N/A   Has home health visited the patient within 72 hours of discharge?  N/A   Psychosocial issues?  No   Did the patient receive a copy of their discharge instructions?  Yes   Nursing interventions  Reviewed instructions with patient   What is the patient's perception of their health status since discharge?  Improving   Nursing interventions  Nurse provided patient education   Is the patient /caregiver able to teach back basic post-op care?  Lifting as instructed by MD in discharge instructions, Practice 'cough and deep breath', Continue use of incentive spirometry at least 1 week post discharge   Is the patient/caregiver able to teach back signs and symptoms of incisional infection?  Fever   Is the patient/caregiver able to teach back steps to recovery at home?  Set small,  Mom concerned that child threw up twice this morning. No fever, no diarrhea, behaving in usual manner. Mom will observe. If symptoms persist or worsen, mom will call the office.    achievable goals for return to baseline health, Rest and rebuild strength, gradually increase activity   Is the patient/caregiver able to teach back the hierarchy of who to call/visit for symptoms/problems? PCP, Specialist, Home health nurse, Urgent Care, ED, 911  Yes   Week 1 call completed?  Yes   Revoked  No further contact(revokes)-requires comment          Eun Jaimes RN

## (undated) DEVICE — BITEBLOCK ENDO W/STRAP 60F A/ LF DISP

## (undated) DEVICE — SPHINCTEROTOME: Brand: DREAMTOME™ RX 44

## (undated) DEVICE — DEV POSITION LK AND BIOP CAP SYS

## (undated) DEVICE — PAPR PRNT PK SONY W RIBN UPC55

## (undated) DEVICE — RETRIEVAL BALLOON CATHETER: Brand: EXTRACTOR™ PRO RX

## (undated) DEVICE — GLV SURG TRIUMPH LT PF LTX 7.5 STRL

## (undated) DEVICE — PK ENDO GI 50

## (undated) DEVICE — 3M™ PATIENT PLATE, CORDED, SPLIT, LARGE, 40 PER CASE, 1179: Brand: 3M™